# Patient Record
Sex: FEMALE | Race: WHITE | NOT HISPANIC OR LATINO | Employment: OTHER | URBAN - METROPOLITAN AREA
[De-identification: names, ages, dates, MRNs, and addresses within clinical notes are randomized per-mention and may not be internally consistent; named-entity substitution may affect disease eponyms.]

---

## 2020-03-06 ENCOUNTER — HOSPITAL ENCOUNTER (OUTPATIENT)
Dept: OTHER | Facility: HOSPITAL | Age: 65
Discharge: HOME | End: 2020-03-06
Attending: NURSE PRACTITIONER

## 2020-03-06 LAB
ANION GAP SERPL CALC-SCNC: 22 MMOL/L (ref 8–19)
BASOPHILS # BLD AUTO: 0.05 10*3/UL (ref 0–0.2)
BASOPHILS NFR BLD AUTO: 0.8 % (ref 0–3)
BUN SERPL-MCNC: 12 MG/DL (ref 5–25)
BUN/CREAT SERPL: 16 {RATIO} (ref 6–20)
CALCIUM SERPL-MCNC: 8.8 MG/DL (ref 8.7–10.4)
CHLORIDE SERPL-SCNC: 101 MMOL/L (ref 99–111)
CHOLEST SERPL-MCNC: 220 MG/DL (ref 107–200)
CHOLEST/HDLC SERPL: 4 {RATIO} (ref 3–6)
CONV ABS IMM GRAN: 0.03 10*3/UL (ref 0–0.2)
CONV CO2: 21 MMOL/L (ref 22–32)
CONV IMMATURE GRAN: 0.5 % (ref 0–1.8)
CREAT UR-MCNC: 0.76 MG/DL (ref 0.5–0.9)
DEPRECATED RDW RBC AUTO: 47.3 FL (ref 36.4–46.3)
EOSINOPHIL # BLD AUTO: 0.16 10*3/UL (ref 0–0.7)
EOSINOPHIL # BLD AUTO: 2.6 % (ref 0–7)
ERYTHROCYTE [DISTWIDTH] IN BLOOD BY AUTOMATED COUNT: 13.2 % (ref 11.7–14.4)
FOLATE SERPL-MCNC: 12.4 NG/ML (ref 4.8–20)
GFR SERPLBLD BASED ON 1.73 SQ M-ARVRAT: >60 ML/MIN/{1.73_M2}
GLUCOSE SERPL-MCNC: 108 MG/DL (ref 65–99)
HCT VFR BLD AUTO: 42.6 % (ref 37–47)
HDLC SERPL-MCNC: 55 MG/DL (ref 40–60)
HGB BLD-MCNC: 13.6 G/DL (ref 12–16)
LDLC SERPL CALC-MCNC: 124 MG/DL (ref 70–100)
LYMPHOCYTES # BLD AUTO: 2.13 10*3/UL (ref 1–5)
LYMPHOCYTES NFR BLD AUTO: 34.5 % (ref 20–45)
MCH RBC QN AUTO: 30.9 PG (ref 27–31)
MCHC RBC AUTO-ENTMCNC: 31.9 G/DL (ref 33–37)
MCV RBC AUTO: 96.8 FL (ref 81–99)
MONOCYTES # BLD AUTO: 0.47 10*3/UL (ref 0.2–1.2)
MONOCYTES NFR BLD AUTO: 7.6 % (ref 3–10)
NEUTROPHILS # BLD AUTO: 3.34 10*3/UL (ref 2–8)
NEUTROPHILS NFR BLD AUTO: 54 % (ref 30–85)
NRBC CBCN: 0 % (ref 0–0.7)
OSMOLALITY SERPL CALC.SUM OF ELEC: 290 MOSM/KG (ref 273–304)
PLATELET # BLD AUTO: 214 10*3/UL (ref 130–400)
PMV BLD AUTO: 10.5 FL (ref 9.4–12.3)
POTASSIUM SERPL-SCNC: 3.9 MMOL/L (ref 3.5–5.3)
RBC # BLD AUTO: 4.4 10*6/UL (ref 4.2–5.4)
SODIUM SERPL-SCNC: 140 MMOL/L (ref 135–147)
TRIGL SERPL-MCNC: 207 MG/DL (ref 40–150)
VIT B12 SERPL-MCNC: 255 PG/ML (ref 211–911)
VLDLC SERPL-MCNC: 41 MG/DL (ref 5–37)
WBC # BLD AUTO: 6.18 10*3/UL (ref 4.8–10.8)

## 2020-03-30 ENCOUNTER — HOSPITAL ENCOUNTER (OUTPATIENT)
Dept: OTHER | Facility: HOSPITAL | Age: 65
Discharge: HOME | End: 2020-03-30
Attending: PHYSICAL MEDICINE & REHABILITATION

## 2020-03-30 LAB
ALBUMIN SERPL-MCNC: 4.1 G/DL (ref 3.5–5)
ALBUMIN/GLOB SERPL: 1.7 {RATIO} (ref 1.4–2.6)
ALP SERPL-CCNC: 65 U/L (ref 43–160)
ALT SERPL-CCNC: 8 U/L (ref 10–40)
ANION GAP SERPL CALC-SCNC: 19 MMOL/L (ref 8–19)
AST SERPL-CCNC: 11 U/L (ref 15–50)
BASOPHILS # BLD AUTO: 0.08 10*3/UL (ref 0–0.2)
BASOPHILS NFR BLD AUTO: 0.9 % (ref 0–3)
BILIRUB SERPL-MCNC: <0.15 MG/DL (ref 0.2–1.3)
BUN SERPL-MCNC: 13 MG/DL (ref 5–25)
BUN/CREAT SERPL: 15 {RATIO} (ref 6–20)
CALCIUM SERPL-MCNC: 9 MG/DL (ref 8.7–10.4)
CHLORIDE SERPL-SCNC: 106 MMOL/L (ref 99–111)
CHOLEST SERPL-MCNC: 236 MG/DL (ref 107–200)
CHOLEST/HDLC SERPL: 5.1 {RATIO} (ref 3–6)
CONV ABS IMM GRAN: 0.02 10*3/UL (ref 0–0.2)
CONV CO2: 21 MMOL/L (ref 22–32)
CONV IMMATURE GRAN: 0.2 % (ref 0–1.8)
CONV TOTAL PROTEIN: 6.5 G/DL (ref 6.3–8.2)
CREAT UR-MCNC: 0.89 MG/DL (ref 0.5–0.9)
DEPRECATED RDW RBC AUTO: 45.2 FL (ref 36.4–46.3)
EOSINOPHIL # BLD AUTO: 0.26 10*3/UL (ref 0–0.7)
EOSINOPHIL # BLD AUTO: 3 % (ref 0–7)
ERYTHROCYTE [DISTWIDTH] IN BLOOD BY AUTOMATED COUNT: 12.7 % (ref 11.7–14.4)
GFR SERPLBLD BASED ON 1.73 SQ M-ARVRAT: >60 ML/MIN/{1.73_M2}
GLOBULIN UR ELPH-MCNC: 2.4 G/DL (ref 2–3.5)
GLUCOSE SERPL-MCNC: 117 MG/DL (ref 65–99)
HCT VFR BLD AUTO: 43.3 % (ref 37–47)
HDLC SERPL-MCNC: 46 MG/DL (ref 40–60)
HGB BLD-MCNC: 14.3 G/DL (ref 12–16)
LDLC SERPL CALC-MCNC: 141 MG/DL (ref 70–100)
LYMPHOCYTES # BLD AUTO: 3.96 10*3/UL (ref 1–5)
LYMPHOCYTES NFR BLD AUTO: 46 % (ref 20–45)
MCH RBC QN AUTO: 31.6 PG (ref 27–31)
MCHC RBC AUTO-ENTMCNC: 33 G/DL (ref 33–37)
MCV RBC AUTO: 95.8 FL (ref 81–99)
MONOCYTES # BLD AUTO: 0.64 10*3/UL (ref 0.2–1.2)
MONOCYTES NFR BLD AUTO: 7.4 % (ref 3–10)
NEUTROPHILS # BLD AUTO: 3.64 10*3/UL (ref 2–8)
NEUTROPHILS NFR BLD AUTO: 42.5 % (ref 30–85)
NRBC CBCN: 0 % (ref 0–0.7)
OSMOLALITY SERPL CALC.SUM OF ELEC: 295 MOSM/KG (ref 273–304)
PLATELET # BLD AUTO: 222 10*3/UL (ref 130–400)
PMV BLD AUTO: 10.4 FL (ref 9.4–12.3)
POTASSIUM SERPL-SCNC: 4.1 MMOL/L (ref 3.5–5.3)
RBC # BLD AUTO: 4.52 10*6/UL (ref 4.2–5.4)
SODIUM SERPL-SCNC: 142 MMOL/L (ref 135–147)
T4 FREE SERPL-MCNC: 0.9 NG/DL (ref 0.9–1.8)
TRIGL SERPL-MCNC: 246 MG/DL (ref 40–150)
TSH SERPL-ACNC: 3.78 M[IU]/L (ref 0.27–4.2)
VALPROATE SERPL-MCNC: 15 UG/ML (ref 50–100)
VLDLC SERPL-MCNC: 49 MG/DL (ref 5–37)
WBC # BLD AUTO: 8.6 10*3/UL (ref 4.8–10.8)

## 2020-10-20 ENCOUNTER — HOSPITAL ENCOUNTER (OUTPATIENT)
Dept: OTHER | Facility: HOSPITAL | Age: 65
Discharge: HOME | End: 2020-10-20
Attending: PHYSICAL MEDICINE & REHABILITATION

## 2020-10-20 LAB
APPEARANCE UR: CLEAR
BILIRUB UR QL: NEGATIVE
COLOR UR: YELLOW
CONV COLLECTION SOURCE (UA): NORMAL
CONV UROBILINOGEN IN URINE BY AUTOMATED TEST STRIP: 1 {EHRLICHU}/DL (ref 0.1–1)
GLUCOSE UR QL: NEGATIVE MG/DL
HGB UR QL STRIP: NEGATIVE
KETONES UR QL STRIP: NEGATIVE MG/DL
LEUKOCYTE ESTERASE UR QL STRIP: NEGATIVE
NITRITE UR QL STRIP: NEGATIVE
PH UR STRIP.AUTO: 7.5 [PH] (ref 5–8)
PROT UR QL: NEGATIVE MG/DL
SP GR UR: 1.02 (ref 1–1.03)

## 2020-10-22 LAB — BACTERIA UR CULT: NORMAL

## 2020-10-30 ENCOUNTER — HOSPITAL ENCOUNTER (OUTPATIENT)
Dept: OTHER | Facility: HOSPITAL | Age: 65
Discharge: HOME | End: 2020-10-30
Attending: NURSE PRACTITIONER

## 2020-11-01 LAB
AMPICILLIN SUSC ISLT: <=2
AMPICILLIN+SULBAC SUSC ISLT: <=2
BACTERIA UR CULT: ABNORMAL
CEFAZOLIN SUSC ISLT: <=4
CEFEPIME SUSC ISLT: <=0.12
CEFTAZIDIME SUSC ISLT: <=1
CEFTRIAXONE SUSC ISLT: <=0.25
CIPROFLOXACIN SUSC ISLT: <=0.25
ERTAPENEM SUSC ISLT: <=0.12
GENTAMICIN SUSC ISLT: <=1
LEVOFLOXACIN SUSC ISLT: <=0.12
NITROFURANTOIN SUSC ISLT: 128
PIP+TAZO SUSC ISLT: <=4
TMP SMX SUSC ISLT: <=20
TOBRAMYCIN SUSC ISLT: <=1

## 2020-11-23 ENCOUNTER — HOSPITAL ENCOUNTER (OUTPATIENT)
Dept: OTHER | Facility: HOSPITAL | Age: 65
Discharge: HOME | End: 2020-11-23
Attending: PHYSICAL MEDICINE & REHABILITATION

## 2020-11-25 LAB — BACTERIA UR CULT: NORMAL

## 2021-05-23 ENCOUNTER — TRANSCRIBE ORDERS (OUTPATIENT)
Dept: ADMINISTRATIVE | Facility: HOSPITAL | Age: 66
End: 2021-05-23

## 2021-05-23 DIAGNOSIS — Z12.31 OTHER SCREENING MAMMOGRAM: Primary | ICD-10-CM

## 2021-09-17 ENCOUNTER — APPOINTMENT (OUTPATIENT)
Dept: MAMMOGRAPHY | Facility: HOSPITAL | Age: 66
End: 2021-09-17

## 2021-10-07 ENCOUNTER — APPOINTMENT (OUTPATIENT)
Dept: GENERAL RADIOLOGY | Facility: HOSPITAL | Age: 66
End: 2021-10-07

## 2021-10-07 ENCOUNTER — HOSPITAL ENCOUNTER (INPATIENT)
Facility: HOSPITAL | Age: 66
LOS: 3 days | Discharge: HOME | End: 2021-10-10
Attending: EMERGENCY MEDICINE | Admitting: INTERNAL MEDICINE

## 2021-10-07 ENCOUNTER — APPOINTMENT (OUTPATIENT)
Dept: CT IMAGING | Facility: HOSPITAL | Age: 66
End: 2021-10-07

## 2021-10-07 DIAGNOSIS — R41.82 ALTERED MENTAL STATUS, UNSPECIFIED ALTERED MENTAL STATUS TYPE: ICD-10-CM

## 2021-10-07 DIAGNOSIS — R27.0 ATAXIA: ICD-10-CM
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
## 2021-10-07 DIAGNOSIS — S09.90XA MINOR HEAD INJURY, INITIAL ENCOUNTER: ICD-10-CM

## 2021-10-07 DIAGNOSIS — E87.1 HYPONATREMIA: ICD-10-CM

## 2021-10-07 DIAGNOSIS — T50.905A MEDICATION REACTION, INITIAL ENCOUNTER: Primary | ICD-10-CM

## 2021-10-07 DIAGNOSIS — E22.2 SIADH (SYNDROME OF INAPPROPRIATE ADH PRODUCTION) (HCC): ICD-10-CM

## 2021-10-07 LAB
ALBUMIN SERPL-MCNC: 3.7 G/DL (ref 3.5–5.2)
ALBUMIN/GLOB SERPL: 1.9 G/DL
ALP SERPL-CCNC: 38 U/L (ref 39–117)
ALT SERPL W P-5'-P-CCNC: 8 U/L (ref 1–33)
AMMONIA BLD-SCNC: 24 UMOL/L (ref 11–51)
AMPHET+METHAMPHET UR QL: NEGATIVE
ANION GAP SERPL CALCULATED.3IONS-SCNC: 13.6 MMOL/L (ref 5–15)
AST SERPL-CCNC: 11 U/L (ref 1–32)
BARBITURATES UR QL SCN: NEGATIVE
BASOPHILS # BLD AUTO: 0.06 10*3/MM3 (ref 0–0.2)
BASOPHILS NFR BLD AUTO: 1.2 % (ref 0–1.5)
BENZODIAZ UR QL SCN: NEGATIVE
BILIRUB SERPL-MCNC: 0.3 MG/DL (ref 0–1.2)
BUN SERPL-MCNC: 9 MG/DL (ref 8–23)
BUN/CREAT SERPL: 11.8 (ref 7–25)
CALCIUM SPEC-SCNC: 8.9 MG/DL (ref 8.6–10.5)
CANNABINOIDS SERPL QL: NEGATIVE
CHLORIDE SERPL-SCNC: 86 MMOL/L (ref 98–107)
CO2 SERPL-SCNC: 21.4 MMOL/L (ref 22–29)
COCAINE UR QL: NEGATIVE
CREAT SERPL-MCNC: 0.76 MG/DL (ref 0.57–1)
DEPRECATED RDW RBC AUTO: 40.6 FL (ref 37–54)
EOSINOPHIL # BLD AUTO: 0.13 10*3/MM3 (ref 0–0.4)
EOSINOPHIL NFR BLD AUTO: 2.7 % (ref 0.3–6.2)
ERYTHROCYTE [DISTWIDTH] IN BLOOD BY AUTOMATED COUNT: 11.9 % (ref 12.3–15.4)
ETHANOL BLD-MCNC: <10 MG/DL (ref 0–10)
ETHANOL UR QL: <0.01 %
GFR SERPL CREATININE-BSD FRML MDRD: 76 ML/MIN/1.73
GLOBULIN UR ELPH-MCNC: 1.9 GM/DL
GLUCOSE BLDC GLUCOMTR-MCNC: 85 MG/DL (ref 70–99)
GLUCOSE SERPL-MCNC: 98 MG/DL (ref 65–99)
HCT VFR BLD AUTO: 42.6 % (ref 34–46.6)
HGB BLD-MCNC: 14.5 G/DL (ref 12–15.9)
HOLD SPECIMEN: NORMAL
HOLD SPECIMEN: NORMAL
IMM GRANULOCYTES # BLD AUTO: 0.07 10*3/MM3 (ref 0–0.05)
IMM GRANULOCYTES NFR BLD AUTO: 1.4 % (ref 0–0.5)
INR PPP: 1.2 (ref 2–3)
LYMPHOCYTES # BLD AUTO: 1.56 10*3/MM3 (ref 0.7–3.1)
LYMPHOCYTES NFR BLD AUTO: 32.1 % (ref 19.6–45.3)
MAGNESIUM SERPL-MCNC: 1.8 MG/DL (ref 1.6–2.4)
MCH RBC QN AUTO: 31.6 PG (ref 26.6–33)
MCHC RBC AUTO-ENTMCNC: 34 G/DL (ref 31.5–35.7)
MCV RBC AUTO: 92.8 FL (ref 79–97)
METHADONE UR QL SCN: NEGATIVE
MONOCYTES # BLD AUTO: 0.43 10*3/MM3 (ref 0.1–0.9)
MONOCYTES NFR BLD AUTO: 8.8 % (ref 5–12)
NEUTROPHILS NFR BLD AUTO: 2.61 10*3/MM3 (ref 1.7–7)
NEUTROPHILS NFR BLD AUTO: 53.8 % (ref 42.7–76)
NRBC BLD AUTO-RTO: 0 /100 WBC (ref 0–0.2)
OPIATES UR QL: NEGATIVE
OXYCODONE UR QL SCN: NEGATIVE
PLATELET # BLD AUTO: 147 10*3/MM3 (ref 140–450)
PMV BLD AUTO: 9.4 FL (ref 6–12)
POTASSIUM SERPL-SCNC: 3.6 MMOL/L (ref 3.5–5.2)
PROT SERPL-MCNC: 5.6 G/DL (ref 6–8.5)
PROTHROMBIN TIME: 12.8 SECONDS (ref 9.4–12)
RBC # BLD AUTO: 4.59 10*6/MM3 (ref 3.77–5.28)
SODIUM SERPL-SCNC: 121 MMOL/L (ref 136–145)
TROPONIN T SERPL-MCNC: <0.01 NG/ML (ref 0–0.03)
TROPONIN T SERPL-MCNC: <0.01 NG/ML (ref 0–0.03)
VALPROATE SERPL-MCNC: 147.4 MCG/ML (ref 50–125)
WBC # BLD AUTO: 4.86 10*3/MM3 (ref 3.4–10.8)
WHOLE BLOOD HOLD SPECIMEN: NORMAL
WHOLE BLOOD HOLD SPECIMEN: NORMAL

## 2021-10-07 PROCEDURE — 80307 DRUG TEST PRSMV CHEM ANLYZR: CPT | Performed by: EMERGENCY MEDICINE

## 2021-10-07 PROCEDURE — 85610 PROTHROMBIN TIME: CPT | Performed by: EMERGENCY MEDICINE

## 2021-10-07 PROCEDURE — 70450 CT HEAD/BRAIN W/O DYE: CPT

## 2021-10-07 PROCEDURE — 80053 COMPREHEN METABOLIC PANEL: CPT | Performed by: EMERGENCY MEDICINE

## 2021-10-07 PROCEDURE — 93005 ELECTROCARDIOGRAM TRACING: CPT | Performed by: EMERGENCY MEDICINE

## 2021-10-07 PROCEDURE — 83735 ASSAY OF MAGNESIUM: CPT | Performed by: EMERGENCY MEDICINE

## 2021-10-07 PROCEDURE — 71045 X-RAY EXAM CHEST 1 VIEW: CPT

## 2021-10-07 PROCEDURE — 84484 ASSAY OF TROPONIN QUANT: CPT | Performed by: EMERGENCY MEDICINE

## 2021-10-07 PROCEDURE — 80164 ASSAY DIPROPYLACETIC ACD TOT: CPT | Performed by: EMERGENCY MEDICINE

## 2021-10-07 PROCEDURE — 72125 CT NECK SPINE W/O DYE: CPT

## 2021-10-07 PROCEDURE — 82962 GLUCOSE BLOOD TEST: CPT

## 2021-10-07 PROCEDURE — 93010 ELECTROCARDIOGRAM REPORT: CPT | Performed by: INTERNAL MEDICINE

## 2021-10-07 PROCEDURE — 82077 ASSAY SPEC XCP UR&BREATH IA: CPT | Performed by: EMERGENCY MEDICINE

## 2021-10-07 PROCEDURE — 99285 EMERGENCY DEPT VISIT HI MDM: CPT

## 2021-10-07 PROCEDURE — 82140 ASSAY OF AMMONIA: CPT | Performed by: EMERGENCY MEDICINE

## 2021-10-07 PROCEDURE — 85025 COMPLETE CBC W/AUTO DIFF WBC: CPT | Performed by: EMERGENCY MEDICINE

## 2021-10-07 RX ORDER — TOPIRAMATE 100 MG/1
100 TABLET, FILM COATED ORAL 2 TIMES DAILY
COMMUNITY
End: 2021-10-10 | Stop reason: HOSPADM

## 2021-10-07 RX ORDER — SODIUM CHLORIDE 0.9 % (FLUSH) 0.9 %
10 SYRINGE (ML) INJECTION AS NEEDED
Status: DISCONTINUED | OUTPATIENT
Start: 2021-10-07 | End: 2021-10-10 | Stop reason: HOSPADM

## 2021-10-07 RX ORDER — PANTOPRAZOLE SODIUM 40 MG/1
40 TABLET, DELAYED RELEASE ORAL DAILY
COMMUNITY

## 2021-10-07 RX ORDER — CETIRIZINE HYDROCHLORIDE 10 MG/1
10 TABLET ORAL DAILY
COMMUNITY

## 2021-10-07 RX ORDER — BENZTROPINE MESYLATE 1 MG/1
1 TABLET ORAL 2 TIMES DAILY
COMMUNITY

## 2021-10-07 RX ORDER — DIVALPROEX SODIUM 500 MG/1
500 TABLET, DELAYED RELEASE ORAL EVERY MORNING
COMMUNITY

## 2021-10-07 RX ORDER — ESCITALOPRAM OXALATE 10 MG/1
10 TABLET ORAL DAILY
COMMUNITY

## 2021-10-07 RX ORDER — ASPIRIN 81 MG/1
81 TABLET, CHEWABLE ORAL DAILY
COMMUNITY

## 2021-10-07 RX ORDER — QUETIAPINE FUMARATE 300 MG/1
300 TABLET, FILM COATED ORAL EVERY MORNING
COMMUNITY
End: 2021-10-10 | Stop reason: HOSPADM

## 2021-10-07 RX ORDER — HYDROCHLOROTHIAZIDE 12.5 MG/1
12.5 CAPSULE, GELATIN COATED ORAL DAILY
Status: ON HOLD | COMMUNITY
End: 2021-10-08

## 2021-10-07 RX ADMIN — SODIUM CHLORIDE 1000 ML: 9 INJECTION, SOLUTION INTRAVENOUS at 21:04

## 2021-10-08 ENCOUNTER — APPOINTMENT (OUTPATIENT)
Dept: NEUROLOGY | Facility: HOSPITAL | Age: 66
End: 2021-10-08

## 2021-10-08 PROBLEM — E87.1 HYPONATREMIA: Status: ACTIVE | Noted: 2021-10-08

## 2021-10-08 PROBLEM — Z79.899 POLYPHARMACY: Chronic | Status: ACTIVE | Noted: 2021-10-08

## 2021-10-08 PROBLEM — R55 SYNCOPE: Status: ACTIVE | Noted: 2021-10-08

## 2021-10-08 LAB
ALBUMIN SERPL-MCNC: 4.1 G/DL (ref 3.5–5.2)
ALBUMIN/GLOB SERPL: 2.3 G/DL
ALP SERPL-CCNC: 48 U/L (ref 39–117)
ALT SERPL W P-5'-P-CCNC: 8 U/L (ref 1–33)
ANION GAP SERPL CALCULATED.3IONS-SCNC: 8.4 MMOL/L (ref 5–15)
AST SERPL-CCNC: 9 U/L (ref 1–32)
BILIRUB SERPL-MCNC: 0.2 MG/DL (ref 0–1.2)
BUN SERPL-MCNC: 9 MG/DL (ref 8–23)
BUN/CREAT SERPL: 11.1 (ref 7–25)
CALCIUM SPEC-SCNC: 9.1 MG/DL (ref 8.6–10.5)
CHLORIDE SERPL-SCNC: 91 MMOL/L (ref 98–107)
CO2 SERPL-SCNC: 29.6 MMOL/L (ref 22–29)
CREAT SERPL-MCNC: 0.81 MG/DL (ref 0.57–1)
GFR SERPL CREATININE-BSD FRML MDRD: 70 ML/MIN/1.73
GLOBULIN UR ELPH-MCNC: 1.8 GM/DL
GLUCOSE SERPL-MCNC: 96 MG/DL (ref 65–99)
OSMOLALITY UR: 294 MOSM/KG
POTASSIUM SERPL-SCNC: 3.5 MMOL/L (ref 3.5–5.2)
PROLACTIN SERPL-MCNC: 8.7 NG/ML (ref 4.79–23.3)
PROT SERPL-MCNC: 5.9 G/DL (ref 6–8.5)
SODIUM SERPL-SCNC: 129 MMOL/L (ref 136–145)
VALPROATE SERPL-MCNC: 117.2 MCG/ML (ref 50–125)

## 2021-10-08 PROCEDURE — 84146 ASSAY OF PROLACTIN: CPT | Performed by: INTERNAL MEDICINE

## 2021-10-08 PROCEDURE — 80164 ASSAY DIPROPYLACETIC ACD TOT: CPT | Performed by: INTERNAL MEDICINE

## 2021-10-08 PROCEDURE — 25010000002 ENOXAPARIN PER 10 MG: Performed by: INTERNAL MEDICINE

## 2021-10-08 PROCEDURE — 36415 COLL VENOUS BLD VENIPUNCTURE: CPT | Performed by: INTERNAL MEDICINE

## 2021-10-08 PROCEDURE — 25010000002 ONDANSETRON PER 1 MG: Performed by: INTERNAL MEDICINE

## 2021-10-08 PROCEDURE — 95816 EEG AWAKE AND DROWSY: CPT

## 2021-10-08 PROCEDURE — 83935 ASSAY OF URINE OSMOLALITY: CPT | Performed by: INTERNAL MEDICINE

## 2021-10-08 PROCEDURE — 80053 COMPREHEN METABOLIC PANEL: CPT | Performed by: INTERNAL MEDICINE

## 2021-10-08 RX ORDER — QUETIAPINE FUMARATE 200 MG/1
600 TABLET, FILM COATED ORAL NIGHTLY
Status: DISCONTINUED | OUTPATIENT
Start: 2021-10-08 | End: 2021-10-10 | Stop reason: HOSPADM

## 2021-10-08 RX ORDER — ONDANSETRON 2 MG/ML
4 INJECTION INTRAMUSCULAR; INTRAVENOUS EVERY 6 HOURS PRN
Status: DISCONTINUED | OUTPATIENT
Start: 2021-10-08 | End: 2021-10-10 | Stop reason: HOSPADM

## 2021-10-08 RX ORDER — QUETIAPINE FUMARATE 300 MG/1
600 TABLET, FILM COATED ORAL NIGHTLY
COMMUNITY

## 2021-10-08 RX ORDER — DIVALPROEX SODIUM 500 MG/1
1000 TABLET, DELAYED RELEASE ORAL
COMMUNITY

## 2021-10-08 RX ORDER — PANTOPRAZOLE SODIUM 40 MG/1
40 TABLET, DELAYED RELEASE ORAL
Status: DISCONTINUED | OUTPATIENT
Start: 2021-10-08 | End: 2021-10-10 | Stop reason: HOSPADM

## 2021-10-08 RX ORDER — ACETAMINOPHEN 325 MG/1
650 TABLET ORAL EVERY 6 HOURS PRN
Status: DISCONTINUED | OUTPATIENT
Start: 2021-10-08 | End: 2021-10-10 | Stop reason: HOSPADM

## 2021-10-08 RX ORDER — MIRTAZAPINE 45 MG/1
45 TABLET, ORALLY DISINTEGRATING ORAL NIGHTLY
COMMUNITY
End: 2021-10-10 | Stop reason: HOSPADM

## 2021-10-08 RX ORDER — BENZTROPINE MESYLATE 1 MG/1
1 TABLET ORAL EVERY 12 HOURS SCHEDULED
Status: DISCONTINUED | OUTPATIENT
Start: 2021-10-08 | End: 2021-10-10 | Stop reason: HOSPADM

## 2021-10-08 RX ORDER — HYDROCHLOROTHIAZIDE 12.5 MG/1
12.5 CAPSULE, GELATIN COATED ORAL DAILY
COMMUNITY
End: 2021-10-10 | Stop reason: HOSPADM

## 2021-10-08 RX ORDER — ALUMINA, MAGNESIA, AND SIMETHICONE 2400; 2400; 240 MG/30ML; MG/30ML; MG/30ML
15 SUSPENSION ORAL EVERY 4 HOURS PRN
Status: DISCONTINUED | OUTPATIENT
Start: 2021-10-08 | End: 2021-10-10 | Stop reason: HOSPADM

## 2021-10-08 RX ORDER — ERGOCALCIFEROL 1.25 MG/1
50000 CAPSULE ORAL 2 TIMES WEEKLY
COMMUNITY

## 2021-10-08 RX ORDER — LORAZEPAM 2 MG/ML
1 INJECTION INTRAMUSCULAR EVERY 4 HOURS PRN
Status: DISCONTINUED | OUTPATIENT
Start: 2021-10-08 | End: 2021-10-10 | Stop reason: HOSPADM

## 2021-10-08 RX ORDER — ESCITALOPRAM OXALATE 10 MG/1
10 TABLET ORAL DAILY
Status: DISCONTINUED | OUTPATIENT
Start: 2021-10-08 | End: 2021-10-10 | Stop reason: HOSPADM

## 2021-10-08 RX ADMIN — QUETIAPINE FUMARATE 600 MG: 200 TABLET ORAL at 20:03

## 2021-10-08 RX ADMIN — Medication 10 ML: at 09:41

## 2021-10-08 RX ADMIN — ESCITALOPRAM OXALATE 10 MG: 10 TABLET ORAL at 15:30

## 2021-10-08 RX ADMIN — ACETAMINOPHEN 650 MG: 325 TABLET ORAL at 09:39

## 2021-10-08 RX ADMIN — ONDANSETRON 4 MG: 2 INJECTION INTRAMUSCULAR; INTRAVENOUS at 04:58

## 2021-10-08 RX ADMIN — PANTOPRAZOLE SODIUM 40 MG: 40 TABLET, DELAYED RELEASE ORAL at 15:29

## 2021-10-08 RX ADMIN — BENZTROPINE MESYLATE 1 MG: 1 TABLET ORAL at 15:30

## 2021-10-08 RX ADMIN — ALUMINUM HYDROXIDE, MAGNESIUM HYDROXIDE, AND DIMETHICONE 15 ML: 400; 400; 40 SUSPENSION ORAL at 10:27

## 2021-10-08 RX ADMIN — ACETAMINOPHEN 650 MG: 325 TABLET ORAL at 15:35

## 2021-10-08 RX ADMIN — ALUMINUM HYDROXIDE, MAGNESIUM HYDROXIDE, AND DIMETHICONE 15 ML: 400; 400; 40 SUSPENSION ORAL at 15:31

## 2021-10-08 RX ADMIN — ACETAMINOPHEN 650 MG: 325 TABLET ORAL at 02:06

## 2021-10-08 RX ADMIN — ENOXAPARIN SODIUM 40 MG: 40 INJECTION SUBCUTANEOUS at 15:30

## 2021-10-08 RX ADMIN — BENZTROPINE MESYLATE 1 MG: 1 TABLET ORAL at 20:03

## 2021-10-08 NOTE — PLAN OF CARE
Goal Outcome Evaluation:  Plan of Care Reviewed With: patient   Vital signs are stable. Pt is awake and alert this shift. Had EEG completed per MD orders. Normal sinus rhythm on the cardiac monitor.

## 2021-10-08 NOTE — ED PROVIDER NOTES
Time: 8:08 PM EDT  Arrived by: ambulance  Chief Complaint: fall  History provided by: patient  History is limited by: N/A     History of Present Illness:  Patient is a 66 y.o. year old female that presents to the emergency department with fall. Pt states she got up from laying in bed and fell into the bathroom. Pt hit her head and had positive LOC.     Pt is not on blood thinners.       Fall  Mechanism of injury: fall    Injury location:  Head/neck  Head/neck injury location:  Head  Incident location:  Bathroom  Time since incident:  1 hour  Arrived directly from scene: yes    Fall:     Fall occurred:  Walking    Impact surface:  Hard floor    Entrapped after fall: no    Protective equipment: none    Suspicion of alcohol use: no    Suspicion of drug use: no    Prior to arrival data:     Loss of consciousness: yes    Associated symptoms: no back pain, no chest pain, no neck pain, no seizures and no vomiting        Similar Symptoms Previously: no  Recently seen: yes      Patient Care Team  Primary Care Provider: Provider, No Known    Past Medical History:     No Known Allergies  Past Medical History:   Diagnosis Date   • GERD (gastroesophageal reflux disease)    • Hypertension    • Seizures (HCC)      Past Surgical History:   Procedure Laterality Date   • BREAST SURGERY     • HYSTERECTOMY       History reviewed. No pertinent family history.    Home Medications:  Prior to Admission medications    Not on File        Social History:   Social History     Tobacco Use   • Smoking status: Current Every Day Smoker     Packs/day: 1.00   Substance Use Topics   • Alcohol use: Not Currently   • Drug use: Never         Review of Systems:  Review of Systems   Constitutional: Negative for chills and fever.   HENT: Negative for nosebleeds.    Eyes: Negative for redness.   Respiratory: Negative for cough and shortness of breath.    Cardiovascular: Negative for chest pain.   Gastrointestinal: Negative for diarrhea and vomiting.  "  Genitourinary: Negative for dysuria and frequency.   Musculoskeletal: Negative for back pain and neck pain.   Skin: Negative for rash.   Neurological: Negative for seizures and syncope.        Physical Exam:  /63 (BP Location: Left arm, Patient Position: Sitting)   Pulse 88   Temp 98.7 °F (37.1 °C) (Oral)   Resp 20   Ht 157.5 cm (62.01\")   Wt 88.2 kg (194 lb 7.1 oz)   SpO2 96%   BMI 35.56 kg/m²     Physical Exam  Vitals and nursing note reviewed.   Constitutional:       General: She is not in acute distress.     Appearance: Normal appearance. She is not toxic-appearing.   HENT:      Head: Normocephalic and atraumatic.      Comments: No external signs of trauma to head or back of head.      Nose: Nose normal.      Mouth/Throat:      Pharynx: Oropharynx is clear.   Eyes:      General: Lids are normal. No scleral icterus.     Extraocular Movements:      Right eye: No nystagmus.      Left eye: No nystagmus.      Conjunctiva/sclera: Conjunctivae normal.   Cardiovascular:      Rate and Rhythm: Normal rate and regular rhythm.      Pulses: Normal pulses.      Heart sounds: Normal heart sounds. No murmur heard.     Pulmonary:      Effort: Pulmonary effort is normal. No respiratory distress.      Breath sounds: Normal breath sounds. Decreased air movement (slightly diminished) present. No wheezing, rhonchi or rales.   Chest:      Chest wall: No tenderness.   Abdominal:      Palpations: Abdomen is soft.      Tenderness: There is no abdominal tenderness. There is no guarding or rebound.      Comments: No rigidity.   Musculoskeletal:         General: No tenderness. Normal range of motion.      Cervical back: Full passive range of motion without pain, normal range of motion and neck supple. No rigidity, tenderness or bony tenderness. Normal range of motion.      Thoracic back: No bony tenderness.      Right hip: No tenderness or bony tenderness.      Left hip: No tenderness or bony tenderness.      Right upper leg: " No bony tenderness.      Left upper leg: No bony tenderness.      Right knee: No bony tenderness.      Left knee: No bony tenderness.      Right lower leg: No bony tenderness. No edema.      Left lower leg: No bony tenderness. No edema.      Right ankle: No tenderness.      Left ankle: No tenderness.      Comments: No external signs of trauma. Pelvis is stable.    Skin:     General: Skin is warm and dry.      Findings: No rash.   Neurological:      Mental Status: She is alert. She is disoriented.      Cranial Nerves: Dysarthria present.      Sensory: Sensation is intact.      Motor: Motor function is intact. No pronator drift.      Comments: Disoriented to year.    Psychiatric:         Mood and Affect: Mood normal.         Behavior: Behavior normal.                Medications in the Emergency Department:  Medications   sodium chloride 0.9 % flush 10 mL (has no administration in time range)   sodium chloride 0.9 % flush 10 mL (has no administration in time range)   acetaminophen (TYLENOL) tablet 650 mg (650 mg Oral Given 10/8/21 0206)   ondansetron (ZOFRAN) injection 4 mg (has no administration in time range)   LORazepam (ATIVAN) injection 1 mg (has no administration in time range)   sodium chloride 0.9 % bolus 1,000 mL (0 mL Intravenous Stopped 10/8/21 0031)        Labs  Lab Results (last 24 hours)     Procedure Component Value Units Date/Time    Urine Drug Screen - Urine, Clean Catch [[id number]]  (Normal) Collected: 10/07/21 2008    Specimen: Urine, Clean Catch Updated: 10/07/21 2204     Amphet/Methamphet, Screen Negative     Barbiturates Screen, Urine Negative     Benzodiazepine Screen, Urine Negative     Cocaine Screen, Urine Negative     Opiate Screen Negative     THC, Screen, Urine Negative     Methadone Screen, Urine Negative     Oxycodone Screen, Urine Negative    Narrative:      Negative Thresholds Per Drugs Screened:    Amphetamines                 500 ng/ml  Barbiturates                 200  ng/ml  Benzodiazepines              100 ng/ml  Cocaine                      300 ng/ml  Methadone                    300 ng/ml  Opiates                      300 ng/ml  Oxycodone                    100 ng/ml  THC                           50 ng/ml    The Normal Value for all drugs tested is negative. This report includes final unconfirmed screening results to be used for medical treatment purposes only. Unconfirmed results must not be used for non-medical purposes such as employment or legal testing. Clinical consideration should be applied to any drug of abuse test, particularly when unconfirmed results are used.            CBC & Differential [[id number]]  (Abnormal) Collected: 10/07/21 2013    Specimen: Blood Updated: 10/07/21 2028    Narrative:      The following orders were created for panel order CBC & Differential.  Procedure                               Abnormality         Status                     ---------                               -----------         ------                     CBC Auto Differential[[id number]]        Abnormal            Final result                 Please view results for these tests on the individual orders.    Comprehensive Metabolic Panel [[id number]]  (Abnormal) Collected: 10/07/21 2013    Specimen: Blood Updated: 10/07/21 2112     Glucose 98 mg/dL      BUN 9 mg/dL      Creatinine 0.76 mg/dL      Sodium 121 mmol/L      Potassium 3.6 mmol/L      Comment: Slight hemolysis detected by analyzer. Results may be affected.        Chloride 86 mmol/L      CO2 21.4 mmol/L      Calcium 8.9 mg/dL      Total Protein 5.6 g/dL      Albumin 3.70 g/dL      ALT (SGPT) 8 U/L      AST (SGOT) 11 U/L      Comment: Slight hemolysis detected by analyzer. Results may be affected.        Alkaline Phosphatase 38 U/L      Total Bilirubin 0.3 mg/dL      eGFR Non African Amer 76 mL/min/1.73      Globulin 1.9 gm/dL      A/G Ratio 1.9 g/dL      BUN/Creatinine Ratio 11.8     Anion Gap 13.6 mmol/L     Narrative:       GFR Normal >60  Chronic Kidney Disease <60  Kidney Failure <15      Troponin [[id number]]  (Normal) Collected: 10/07/21 2013    Specimen: Blood Updated: 10/07/21 2101     Troponin T <0.010 ng/mL     Narrative:      Troponin T Reference Range:  <= 0.03 ng/mL-   Negative for AMI  >0.03 ng/mL-     Abnormal for myocardial necrosis.  Clinicians would have to utilize clinical acumen, EKG, Troponin and serial changes to determine if it is an Acute Myocardial Infarction or myocardial injury due to an underlying chronic condition.       Results may be falsely decreased if patient taking Biotin.      Magnesium [[id number]]  (Normal) Collected: 10/07/21 2013    Specimen: Blood Updated: 10/07/21 2101     Magnesium 1.8 mg/dL     CBC Auto Differential [[id number]]  (Abnormal) Collected: 10/07/21 2013    Specimen: Blood Updated: 10/07/21 2028     WBC 4.86 10*3/mm3      RBC 4.59 10*6/mm3      Hemoglobin 14.5 g/dL      Hematocrit 42.6 %      MCV 92.8 fL      MCH 31.6 pg      MCHC 34.0 g/dL      RDW 11.9 %      RDW-SD 40.6 fl      MPV 9.4 fL      Platelets 147 10*3/mm3      Neutrophil % 53.8 %      Lymphocyte % 32.1 %      Monocyte % 8.8 %      Eosinophil % 2.7 %      Basophil % 1.2 %      Immature Grans % 1.4 %      Neutrophils, Absolute 2.61 10*3/mm3      Lymphocytes, Absolute 1.56 10*3/mm3      Monocytes, Absolute 0.43 10*3/mm3      Eosinophils, Absolute 0.13 10*3/mm3      Basophils, Absolute 0.06 10*3/mm3      Immature Grans, Absolute 0.07 10*3/mm3      nRBC 0.0 /100 WBC     Valproic Acid Level, Total [[id number]]  (Abnormal) Collected: 10/07/21 2013    Specimen: Blood Updated: 10/07/21 2129     Valproic Acid 147.4 mcg/mL     Narrative:      Therapeutic Ranges for Valproic Acid    Epilepsy:       mcg/ml  Bipolar/Nadiya  up to 125 mcg/ml      Ethanol [[id number]] Collected: 10/07/21 2013    Specimen: Blood Updated: 10/07/21 2129     Ethanol <10 mg/dL      Ethanol % <0.010 %     Narrative:      Ethanol (Plasma)  <10 Essentially  Negative    Toxic Concentrations           mg/dL    Flushing, slowing of reflexes    Impaired visual activity         Depression of CNS              >100  Possible Coma                  >300       POC Glucose Once [[id number]]  (Normal) Collected: 10/07/21 2018    Specimen: Blood Updated: 10/07/21 2022     Glucose 85 mg/dL      Comment: Serial Number: [id number]:  [id number]       Protime-INR [[id number]]  (Abnormal) Collected: 10/07/21 2134    Specimen: Blood Updated: 10/07/21 2157     Protime 12.8 Seconds      INR 1.20    Narrative:      Suggested Therapeutic Ranges For Oral Anticoagulant Therapy:  Level of Therapy                      INR Target Range  Standard Dose                            2.0-3.0  High Dose                                2.5-3.5  Patients not receiving anticoagulant  Therapy Normal Range                     0.6-1.2    Ammonia [[id number]]  (Normal) Collected: 10/07/21 2134    Specimen: Blood Updated: 10/07/21 2202     Ammonia 24 umol/L     Troponin [[id number]]  (Normal) Collected: 10/07/21 2134    Specimen: Blood Updated: 10/07/21 2203     Troponin T <0.010 ng/mL     Narrative:      Troponin T Reference Range:  <= 0.03 ng/mL-   Negative for AMI  >0.03 ng/mL-     Abnormal for myocardial necrosis.  Clinicians would have to utilize clinical acumen, EKG, Troponin and serial changes to determine if it is an Acute Myocardial Infarction or myocardial injury due to an underlying chronic condition.       Results may be falsely decreased if patient taking Biotin.             Imaging:  CT Head Without Contrast    Result Date: 10/7/2021  PROCEDURE: CT HEAD WO CONTRAST  COMPARISON: [facility], CT, CT CERVICAL SPINE WO CONTRAST, 10/07/2021, 22:10.  INDICATIONS: FALL; LOSS OF CONSCIOUSNESS; HEAD INJURY.  PROTOCOL:   Standard imaging protocol performed    RADIATION:   DLP: 1143mGy*cm   MA and/or KV was adjusted to minimize radiation dose.    TECHNIQUE: After obtaining the  patient's consent, 145 CT images were obtained without non-ionic intravenous contrast material.  DISCUSSION: A routine nonenhanced head CT was performed. No acute brain abnormality is identified. No acute intracranial hemorrhage. No acute infarction. No acute skull fracture. No midline shift or acute intracranial mass effect is seen.  Mild chronic small vessel ischemia/infarction is suspected. There are arterial calcifications. The extra-axial spaces and the ventricular system are mildly prominent.  The patient has undergone bilateral cataract extractions with intra-ocular lens implants.  There is mild chronic leftward nasal septal deviation.  Benign external auditory canal debris is suspected, especially on the left.  CONCLUSION: No acute brain abnormality is seen.     ESPERANZA GOODWIN JR, MD       Electronically Signed and Approved By: ESPERANZA GOODWIN JR, MD on 10/07/2021 at 22:29             CT Cervical Spine Without Contrast    Result Date: 10/7/2021  PROCEDURE: CT CERVICAL SPINE WO CONTRAST  COMPARISON: None.  INDICATIONS: FALL; LOSS OF CONSCIOUSNESS; HEAD INJURY. NECK PAIN.  PROTOCOL:   Standard imaging protocol performed    RADIATION:   DLP: 529.4mGy*cm   MA and/or KV was adjusted to minimize radiation dose.    TECHNIQUE: After obtaining the patient's consent, multi-planar CT images were created without contrast material.   EXAM FINDINGS: A routine nonenhanced cervical spine CT was performed. Sagittal and coronal two-dimensional reformations are provided for review. No acute cervical spine fracture or acute malalignment is identified. Small nonspecific bilateral cervical lymph nodes are seen.  Moderate to severe degenerative changes involve the cervical spine at multiple levels.  Extensive ossification of the anterior longitudinal ligament is seen and may represent diffuse idiopathic skeletal hyperostosis (DISH).  Arterial calcifications are seen.  Multiple-level neural foraminal narrowing is identified.  Mild  to moderate spinal stenosis is seen at several levels.  There are emphysematous changes of the lungs.  There is slight motion artifact on the study.  There may be mild pleural-parenchymal scarring in the partially imaged lung apices.  Degenerative changes involve the temporomandibular joints, probably greater on the left.  CONCLUSION:    No acute cervical spine fracture is seen.   ESPERANZA GOODWIN JR, MD       Electronically Signed and Approved By: ESPERANZA GOODWIN JR, MD on 10/07/2021 at 22:33             XR Chest 1 View    Result Date: 10/7/2021  PROCEDURE: XR CHEST 1 VW  COMPARISON: None.  INDICATIONS: WEAK/DIZZY/AMS TRIAGE PROTOCOL/ALTERED MENTAL STATUS.  FINDINGS: A single AP upright portable chest radiograph was performed.  There are slightly low lung volumes.  Atelectasis and/or infiltrate(s) is (are) seen in the left lung base.  Pneumonia cannot be excluded.  Probably no cardiac enlargement is seen.  The thoracic aorta is atherosclerotic.  External artifacts obscure detail.  No pneumothorax is seen.  There may be chronic calcified granulomatous disease of the chest.  Probably no acute infiltrate is seen on the right.  CONCLUSION: Atelectasis and/or infiltrate(s) is (are) suggested in the left lung base.  The findings may represent pneumonia, including aspiration pneumonia.     ESPERANZA GOODWIN JR, MD       Electronically Signed and Approved By: ESPERANZA GOODWIN JR, MD on 10/07/2021 at 20:56               Procedures:  Procedures    Progress  ED Course as of Oct 08 0248   Thu Oct 07, 2021   2342 EKG:    Rhythm: Sinus rhythm  Rate: 68  Intervals: Normal MI and QT interval  T-wave: Nonspecific diffuse T wave flattening, T wave inversion in V2, V3  ST Segment: No obvious pathological ST ovation or ST depression    EKG Comparison: Unavailable    Interpreted by me      [SD]      ED Course User Index  [SD] Zach Madera DO                            Medical Decision Making:  MDM  Number of Diagnoses or Management Options      Amount and/or Complexity of Data Reviewed  Clinical lab tests: reviewed  Tests in the radiology section of CPT®: reviewed  Tests in the medicine section of CPT®: reviewed  Decide to obtain previous medical records or to obtain history from someone other than the patient: yes  Discuss the patient with other providers: yes (I discussed case with Dr. Coreas.  We have discussed the patient's presenting symptoms, physical exam findings, laboratory evaluation as well as imaging.  He will be admitting the patient to the hospital)                 Final diagnoses:   Medication reaction, initial encounter   SIADH (syndrome of inappropriate ADH production) (HCC)   Hyponatremia   Ataxia   Altered mental status, unspecified altered mental status type   Fall, initial encounter   Minor head injury, initial encounter        Disposition:  ED Disposition     ED Disposition Condition Comment    Decision to Admit  Level of Care: Telemetry [5]   Diagnosis: Medication reaction, initial encounter [[id number]]   Admitting Physician: AISHA COREAS [[id number]]   Attending Physician: AISHA COREAS [[id number]]   Isolate for COVID?: No [0]   Certification: I Certify That Inpatient Hospital Services Are Medically Necessary For Greater Than 2 Midnights            This medical record created using voice recognition software and may contain unintended errors.    Documentation assistance provided by Aimee Wong acting as scribe for Zach Madera DO. Information recorded by the scribe was done at my direction and has been verified and validated by me.          Aimee Wong  10/07/21 2015       Aimee Wong  10/07/21 2018       Zach Madera DO  10/08/21 0248

## 2021-10-08 NOTE — H&P
[facility]   HISTORY AND PHYSICAL    Patient Name: Sasha Fuller  : 1955  MRN: [id number]  Primary Care Physician:  Provider, No Known  Date of admission: 10/7/2021    Subjective   Subjective     Chief Complaint:   Passing out spell    HPI:    Sasha Fuller is a 66 y.o. female admitted to hospital post syncope at home.  Patient reports she walked up to bathroom and fell.  When she woke up she crawled to the phone and called her daughter.  She was brought into ER work-up was negative for acute issues except for mildly elevated valproic acid as well as hyponatremia.  Patient has known history of seizure disorder.  Patient denies any incontinence issues.  She is awake alert and she reports acid reflux today.  She denies any chest pain or shortness of breath, denies any headache.      Review of Systems:    Personal History     Past Medical History:   Diagnosis Date   • GERD (gastroesophageal reflux disease)    • Hypertension    • Seizures (HCC)        Past Surgical History:   Procedure Laterality Date   • BREAST SURGERY     • HYSTERECTOMY         Family History: family history is not on file. Otherwise pertinent FHx was reviewed and not pertinent to current issue.    Social History:  reports that she has been smoking. She has been smoking about 1.00 pack per day. She does not have any smokeless tobacco history on file. She reports previous alcohol use. She reports that she does not use drugs.    Home Medications:  QUEtiapine, aspirin, benztropine, cetirizine, divalproex, escitalopram, hydroCHLOROthiazide, mirtazapine, pantoprazole, topiramate, and vitamin D      Allergies:  No Known Allergies    Objective   Objective     Vitals:   Temp:  [97.6 °F (36.4 °C)-98.7 °F (37.1 °C)] 97.9 °F (36.6 °C)  Heart Rate:  [] 68  Resp:  [16-21] 18  BP: (117-156)/() 141/59    Physical Exam    Elderly female not in acute distress.  ENT with pupils reactive extraocular muscle intact.  Supple no bruit no  thyromegaly  Heart regular.  Lungs clear  Abdomen obese and soft nontender.  Extremities Free of edema cyanosis and clubbing.  Neuro awake alert and oriented    I have personally reviewed the results from the time of this admission to 10/8/2021 10:17 EDT and agree with these findings:  [x]  Laboratory  []  Microbiology  []  Radiology  []  EKG/Telemetry   []  Cardiology/Vascular   []  Pathology  []  Old records  []  Other:    CBC:    WBC   Date Value Ref Range Status   10/07/2021 4.86 3.40 - 10.80 10*3/mm3 Final     RBC   Date Value Ref Range Status   10/07/2021 4.59 3.77 - 5.28 10*6/mm3 Final     Hemoglobin   Date Value Ref Range Status   10/07/2021 14.5 12.0 - 15.9 g/dL Final     Hematocrit   Date Value Ref Range Status   10/07/2021 42.6 34.0 - 46.6 % Final     MCV   Date Value Ref Range Status   10/07/2021 92.8 79.0 - 97.0 fL Final     MCH   Date Value Ref Range Status   10/07/2021 31.6 26.6 - 33.0 pg Final     MCHC   Date Value Ref Range Status   10/07/2021 34.0 31.5 - 35.7 g/dL Final     RDW   Date Value Ref Range Status   10/07/2021 11.9 (L) 12.3 - 15.4 % Final     RDW-SD   Date Value Ref Range Status   10/07/2021 40.6 37.0 - 54.0 fl Final     MPV   Date Value Ref Range Status   10/07/2021 9.4 6.0 - 12.0 fL Final     Platelets   Date Value Ref Range Status   10/07/2021 147 140 - 450 10*3/mm3 Final     Neutrophil %   Date Value Ref Range Status   10/07/2021 53.8 42.7 - 76.0 % Final     Lymphocyte %   Date Value Ref Range Status   10/07/2021 32.1 19.6 - 45.3 % Final     Monocyte %   Date Value Ref Range Status   10/07/2021 8.8 5.0 - 12.0 % Final     Eosinophil %   Date Value Ref Range Status   10/07/2021 2.7 0.3 - 6.2 % Final     Basophil %   Date Value Ref Range Status   10/07/2021 1.2 0.0 - 1.5 % Final     Immature Grans %   Date Value Ref Range Status   10/07/2021 1.4 (H) 0.0 - 0.5 % Final     Neutrophils, Absolute   Date Value Ref Range Status   10/07/2021 2.61 1.70 - 7.00 10*3/mm3 Final     Lymphocytes,  Absolute   Date Value Ref Range Status   10/07/2021 1.56 0.70 - 3.10 10*3/mm3 Final     Monocytes, Absolute   Date Value Ref Range Status   10/07/2021 0.43 0.10 - 0.90 10*3/mm3 Final     Eosinophils, Absolute   Date Value Ref Range Status   10/07/2021 0.13 0.00 - 0.40 10*3/mm3 Final     Basophils, Absolute   Date Value Ref Range Status   10/07/2021 0.06 0.00 - 0.20 10*3/mm3 Final     Immature Grans, Absolute   Date Value Ref Range Status   10/07/2021 0.07 (H) 0.00 - 0.05 10*3/mm3 Final     nRBC   Date Value Ref Range Status   10/07/2021 0.0 0.0 - 0.2 /100 WBC Final        BMP:    Lab Results   Component Value Date    GLUCOSE 96 10/08/2021    BUN 9 10/08/2021    CREATININE 0.81 10/08/2021    EGFRIFNONA 70 10/08/2021    BCR 11.1 10/08/2021    K 3.5 10/08/2021    CO2 29.6 (H) 10/08/2021    CALCIUM 9.1 10/08/2021    ALBUMIN 4.10 10/08/2021    LABIL2 2.0 03/26/2021    AST 9 10/08/2021    ALT 8 10/08/2021        No radiology results for the last day           Assessment/Plan   Assessment / Plan       Current Diagnosis:  Active Hospital Problems    Diagnosis    • Syncope    • Hyponatremia    • Polypharmacy    • Medication reaction, initial encounter      Plan:   Admitted with syncope.  Patient on multiple medications.  Known history of seizure disorder.  Extremely high doses of Seroquel and Remeron with polypharmacy.  Will consult neurologist for opinion.  Hold most of home meds.  Symptomatic treatment for now.  Fluids.  Monitor labs.  Increase activity.  PT and OT consults.  Possible discharge home in 1 to 2 days      DVT prophylaxis:  Medical DVT prophylaxis orders are present.    GI Prophylaxis:    Pepcid and Maalox    CODE STATUS:    Level Of Support Discussed With: Patient  Code Status: CPR  Medical Interventions (Level of Support Prior to Arrest): Full    Admission Status:  I believe this patient meets inpatient status.             I have dictated this note utilizing Dragon Dictation.             Please note that  portions of this note were completed with a voice recognition program.             Part of this note may be an electronic transcription/translation of spoken language to printed text         using the Dragon Dictation System.       Electronically signed by Torsten Morales MD, 10/08/21, 10:14 AM EDT.    Total time spent with in evaluation and management:

## 2021-10-08 NOTE — PLAN OF CARE
Goal Outcome Evaluation: Educated pt on the importance of calling when needing help. Pt agreed and demonstrated use of call light. Bed alarm on at this time.

## 2021-10-09 ENCOUNTER — APPOINTMENT (OUTPATIENT)
Dept: GENERAL RADIOLOGY | Facility: HOSPITAL | Age: 66
End: 2021-10-09

## 2021-10-09 PROCEDURE — 72220 X-RAY EXAM SACRUM TAILBONE: CPT

## 2021-10-09 PROCEDURE — 73562 X-RAY EXAM OF KNEE 3: CPT

## 2021-10-09 PROCEDURE — 25010000002 ENOXAPARIN PER 10 MG: Performed by: INTERNAL MEDICINE

## 2021-10-09 RX ORDER — SODIUM PHOSPHATE,MONO-DIBASIC 19G-7G/118
1 ENEMA (ML) RECTAL ONCE
Status: DISCONTINUED | OUTPATIENT
Start: 2021-10-09 | End: 2021-10-10 | Stop reason: HOSPADM

## 2021-10-09 RX ORDER — SODIUM PHOSPHATE, DIBASIC AND SODIUM PHOSPHATE, MONOBASIC 3.5; 9.5 G/66ML; G/66ML
1 ENEMA RECTAL ONCE
Status: DISCONTINUED | OUTPATIENT
Start: 2021-10-09 | End: 2021-10-09

## 2021-10-09 RX ORDER — POLYETHYLENE GLYCOL 3350 17 G/17G
17 POWDER, FOR SOLUTION ORAL DAILY
Status: DISCONTINUED | OUTPATIENT
Start: 2021-10-09 | End: 2021-10-10 | Stop reason: HOSPADM

## 2021-10-09 RX ADMIN — ALUMINUM HYDROXIDE, MAGNESIUM HYDROXIDE, AND DIMETHICONE 15 ML: 400; 400; 40 SUSPENSION ORAL at 11:40

## 2021-10-09 RX ADMIN — ESCITALOPRAM OXALATE 10 MG: 10 TABLET ORAL at 08:57

## 2021-10-09 RX ADMIN — QUETIAPINE FUMARATE 600 MG: 200 TABLET ORAL at 20:07

## 2021-10-09 RX ADMIN — ENOXAPARIN SODIUM 40 MG: 40 INJECTION SUBCUTANEOUS at 11:39

## 2021-10-09 RX ADMIN — BENZTROPINE MESYLATE 1 MG: 1 TABLET ORAL at 08:57

## 2021-10-09 RX ADMIN — ALUMINUM HYDROXIDE, MAGNESIUM HYDROXIDE, AND DIMETHICONE 15 ML: 400; 400; 40 SUSPENSION ORAL at 07:42

## 2021-10-09 RX ADMIN — BENZTROPINE MESYLATE 1 MG: 1 TABLET ORAL at 20:07

## 2021-10-09 NOTE — PLAN OF CARE
Goal Outcome Evaluation:  Plan of Care Reviewed With: patient        Progress: no change  Outcome Summary: VSS. No acute changes. Continue to monitor.  Zahraa Valles RN

## 2021-10-09 NOTE — PLAN OF CARE
Goal Outcome Evaluation: Pt educated on the importance of using call light before getting up, pt agreed and called out whenever needing help.

## 2021-10-09 NOTE — PROGRESS NOTES
[facility]     Progress Note    Patient Name: Sasha Fuller  : 1955  MRN: [id number]  Primary Care Physician:  Lei Coreas MD  Date of admission: 10/7/2021    Subjective   Subjective     Chief Complaint: Post syncope, hyponatremia, history of seizures in the past, postural hypotension, pain in knee and coccyx    HPI:  Patient Reports pain in the knee and coccyx post fall.  Also complains of constipation for more than a week    Review of Systems   Review of Systems   Constitutional: Activity change: in bed.   HENT: Negative.    Eyes: Negative.    Respiratory: Negative.    Cardiovascular: Negative.    Gastrointestinal: Positive for constipation.   Endocrine: Negative.    Genitourinary: Negative.    Musculoskeletal: Negative.    Skin: Negative.    Allergic/Immunologic: Negative.    Neurological: Positive for weakness.   Hematological: Negative.    Psychiatric/Behavioral: Negative.        Objective   Objective     Vitals:   Temp:  [97.7 °F (36.5 °C)-98.6 °F (37 °C)] 98.4 °F (36.9 °C)  Heart Rate:  [67-84] 83  Resp:  [18-20] 18  BP: (123-154)/(57-71) 153/67  Physical Exam    Constitutional: Awake, alert, oriented cooperative   Eyes: PERRLA, sclerae anicteric, no conjunctival injection   HENT: NCAT, mucous membranes moist   Neck: Supple, no thyromegaly, no lymphadenopathy, trachea midline   Respiratory: Clear to auscultation bilaterally, nonlabored respirations    Cardiovascular: RRR, no murmurs, rubs, or gallops, palpable pedal pulses bilaterally   Gastrointestinal: Positive bowel sounds, soft, nontender, nondistended   Musculoskeletal: No bilateral ankle edema, no clubbing or cyanosis to extremities   Psychiatric: Appropriate affect, cooperative   Neurologic: Oriented x 3, strength symmetric in all extremities, Cranial Nerves grossly intact to confrontation, speech clear   Skin: No rashes     Result Review    Result Review:  I have personally reviewed the results from the time of this admission to  10/9/2021 18:16 EDT and agree with these findings:  [x]  Laboratory  []  Microbiology  []  Radiology  []  EKG/Telemetry   []  Cardiology/Vascular   []  Pathology  []  Old records  []  Other:  Most notable findings include: Syncope, hyponatremia, postural hypotension    Assessment/Plan   Assessment / Plan   Syncope  Hyponatremia  Postural hypotension  Constipation  Pain in coccyx and knee after fall    Brief Patient Summary:  Sasha Fuller is a 66 y.o. female who fell at home and found to have hyponatremia    Active Hospital Problems:  Active Hospital Problems    Diagnosis    • Syncope    • Hyponatremia    • Polypharmacy    • Medication reaction, initial encounter      Plan:   X-rays of the coccyx and knee has been ordered by Dr. Remigio Shah for constipation  Fleet enema for constipation  Continue care  Medication adjusted by Dr. Morales     DVT prophylaxis:  Medical DVT prophylaxis orders are present.    CODE STATUS:   Level Of Support Discussed With: Patient  Code Status: CPR  Medical Interventions (Level of Support Prior to Arrest): Full      Electronically signed by Sergio Cristobal MD, 10/09/21, 6:16 PM EDT.

## 2021-10-09 NOTE — CONSULTS
[facility]   Consult Note      Patient Name: Sasha Fuller  : 1955  MRN: [id number]  Primary Care Physician:  Provider, No Known  Date of admission: 10/7/2021    Subjective   Subjective     66 years old woman was seen upon the request of Dr.Ayas Morales for evaluation.    Chief Complaint:   Syncope    HPI:  She dated the onset of her illness sometime during the night of 2021, when she got up from bed, after sitting on the edge of the bed for a while, she lost her balance and was able to lean herself onto the cabinet in front of her.  She she stayed up and motionless momentarily (she could not guesstimate how long she stood up) then she started walking to the restroom.  When she reached the restroom, she fell down.  There was no associated dizziness.  She claims she just fell down for no apparent reason.  Her buttocks landed on the floor first then the back of her head.  She did not lose consciousness.  She was awake all the time.  There were no shaking or jerking movements noted.    She mentioned that she has a seizure disorder since she was 9 years old, and the last time she had a seizure was in .  She is being maintained on unknown amount of Depakote and Topamax.  She could not remember the dosages.      Review of Systems  There is no difficulty seeing, speaking, swallowing and swallowing.  No difficulty in breathing.  There is no chest pains or abdominal pain is persistent incontinent of her urine.  She did not bite her lips or her tongue during and around the time she fell down on 10/7/2021.      Past Medical History:   Diagnosis Date   • GERD (gastroesophageal reflux disease)    • Hypertension    • Seizures (HCC)        Past Surgical History:   Procedure Laterality Date   • BREAST SURGERY     • HYSTERECTOMY         Family History: family history is not on file. Otherwise pertinent FHx was reviewed and not pertinent to current issue.    Social History:  reports that she has been  smoking. She has been smoking about 1.00 pack per day. She does not have any smokeless tobacco history on file. She reports previous alcohol use. She reports that she does not use drugs.    She started smoking when she was 4 or 5 years old.  She smoked a pack of cigarettes every day after this time.  She mentioned that her parents added vodka to her milk bottle when she was a baby to make her go to sleep.  She continues to drink alcoholic beverages until the time that she was 15 years old when she stopped drinking altogether.  She does not use street drugs.    Psychosocial History: Not known at this time.    Home Medications:  QUEtiapine, aspirin, benztropine, cetirizine, divalproex, escitalopram, hydroCHLOROthiazide, mirtazapine, pantoprazole, topiramate, and vitamin D      Allergies:  No Known Allergies    Vitals:   Temp:  [97.3 °F (36.3 °C)-98.7 °F (37.1 °C)] 98.6 °F (37 °C)  Heart Rate:  [] 72  Resp:  [16-21] 18  BP: (117-156)/() 144/58  Physical Exam   She was awake and alert was not informed distress.  She was para developed and fairly nourished.  The abdomen is flabby and protuberant.  She is overweight.    Her heart was regular.  Heart rate was 70/min.  There were no murmurs.  The lungs were clear.    The carotid pulses were 1+ and equal.  There were no bruit on either side.    Neurological Examination:  The responses were coherent and relevant.  She was aware of what was going on around her.  She has an insight to her condition.  She was able to understand and follow verbal command.    I did not look into the fundus on either side because of the COVID-19 pandemic social distancing.    The pupils were 3 to 4 mm there were round and equal reactive to light directly and consensually.  There were no extraocular muscle weakness.    No facial asymmetry.  No facial weakness.  Was able to hear normal conversational speech.    The strength of the sternomastoid and trapezius muscles were normal and  symmetrical.  The uvula and the tongue were in the midline.    The strength in both upper and lower extremities were normal and equal.    Felt pinprick equal in both sides of the forehead, face, lower jaw, both upper and lower extremities, and both sides of his trunk.    The deep tendon reflexes were absent on both sides.    Did finger-to-nose test fairly well equal on both sides.      Result Review    Result Review:  I have personally reviewed the results from the time of this admission to 10/8/2021 21:21 EDT and agree with these findings:  [x]  Laboratory  []  Microbiology  [x]  Radiology  []  EKG/Telemetry   []  Cardiology/Vascular   []  Pathology  []  Old records  []  Other:  Most notable findings include:   I reviewed the computer images of the CAT scan of her brain was done October 7, 2021.  Study showed no acute changes.  There is moderate cortical atrophy and ventricular dilatation.    We have the report of the EEG that was done on 10/8/2021.  The study was essentially normal.  There were no epileptiform discharges noted.      Impression:    Postural Hypotension  Generalized Seizure Disorder, controlled, none since 2003  Pain, coccyx.  Pain, right knee etiology undetermined.        Plan:   We will do well to table test which can be done on an outpatient basis if she were to go home.    Meanwhile, continue with the present amount of Depakote and Topamax that she has been taking.    We will see what the x-ray of the sacrum and coccyx and the right knee.    Thank you very much for some see this patient.  I will see the patient as needed.                    Electronically signed by Patrick Flood Jr., MD, 10/08/21, 9:21 PM EDT.

## 2021-10-10 VITALS
TEMPERATURE: 97.5 F | DIASTOLIC BLOOD PRESSURE: 51 MMHG | HEIGHT: 62 IN | RESPIRATION RATE: 20 BRPM | OXYGEN SATURATION: 97 % | HEART RATE: 83 BPM | WEIGHT: 194.45 LBS | SYSTOLIC BLOOD PRESSURE: 125 MMHG | BODY MASS INDEX: 35.78 KG/M2

## 2021-10-10 PROBLEM — R27.0 ATAXIA: Status: RESOLVED | Noted: 2021-10-10 | Resolved: 2021-10-10
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
## 2021-10-10 PROBLEM — E22.2 SIADH (SYNDROME OF INAPPROPRIATE ADH PRODUCTION): Status: ACTIVE | Noted: 2021-10-10

## 2021-10-10 PROBLEM — R27.0 ATAXIA: Status: ACTIVE | Noted: 2021-10-10

## 2021-10-10 PROBLEM — R41.82 ALTERED MENTAL STATUS: Status: RESOLVED | Noted: 2021-10-10 | Resolved: 2021-10-10

## 2021-10-10 PROBLEM — R41.82 ALTERED MENTAL STATUS: Status: ACTIVE | Noted: 2021-10-10

## 2021-10-10 PROBLEM — R55 SYNCOPE: Status: RESOLVED | Noted: 2021-10-08 | Resolved: 2021-10-10

## 2021-10-10 PROBLEM — T50.905A MEDICATION REACTION, INITIAL ENCOUNTER: Status: RESOLVED | Noted: 2021-10-07 | Resolved: 2021-10-10

## 2021-10-10 PROBLEM — S09.90XA MINOR HEAD INJURY: Status: ACTIVE | Noted: 2021-10-10
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
## 2021-10-10 PROBLEM — Z79.899 POLYPHARMACY: Chronic | Status: RESOLVED | Noted: 2021-10-08 | Resolved: 2021-10-10

## 2021-10-10 PROBLEM — S09.90XA MINOR HEAD INJURY: Status: RESOLVED | Noted: 2021-10-10 | Resolved: 2021-10-10

## 2021-10-10 PROCEDURE — 94799 UNLISTED PULMONARY SVC/PX: CPT

## 2021-10-10 PROCEDURE — 94760 N-INVAS EAR/PLS OXIMETRY 1: CPT

## 2021-10-10 PROCEDURE — 25010000002 ENOXAPARIN PER 10 MG: Performed by: INTERNAL MEDICINE

## 2021-10-10 RX ORDER — ALUMINA, MAGNESIA, AND SIMETHICONE 2400; 2400; 240 MG/30ML; MG/30ML; MG/30ML
15 SUSPENSION ORAL EVERY 6 HOURS PRN
Qty: 300 ML | Refills: 0 | Status: SHIPPED | OUTPATIENT
Start: 2021-10-10 | End: 2021-11-09

## 2021-10-10 RX ORDER — POLYETHYLENE GLYCOL 3350 17 G/17G
17 POWDER, FOR SOLUTION ORAL DAILY
Qty: 30 PACKET | Refills: 0 | Status: SHIPPED | OUTPATIENT
Start: 2021-10-11 | End: 2021-11-10

## 2021-10-10 RX ADMIN — ACETAMINOPHEN 650 MG: 325 TABLET ORAL at 03:34

## 2021-10-10 RX ADMIN — PANTOPRAZOLE SODIUM 40 MG: 40 TABLET, DELAYED RELEASE ORAL at 05:20

## 2021-10-10 RX ADMIN — BENZTROPINE MESYLATE 1 MG: 1 TABLET ORAL at 10:03

## 2021-10-10 RX ADMIN — POLYETHYLENE GLYCOL 3350 17 G: 17 POWDER, FOR SOLUTION ORAL at 10:04

## 2021-10-10 RX ADMIN — ENOXAPARIN SODIUM 40 MG: 40 INJECTION SUBCUTANEOUS at 10:04

## 2021-10-10 RX ADMIN — ALUMINUM HYDROXIDE, MAGNESIUM HYDROXIDE, AND DIMETHICONE 15 ML: 400; 400; 40 SUSPENSION ORAL at 05:20

## 2021-10-10 RX ADMIN — ESCITALOPRAM OXALATE 10 MG: 10 TABLET ORAL at 10:04

## 2021-10-10 NOTE — DISCHARGE SUMMARY
[facility]         DISCHARGE SUMMARY    Patient Name: Sasha Fuller  : 1955  MRN: [id number]    Date of Admission: 10/7/2021  Date of Discharge: 10/9/2021  Primary Care Physician: Lei Coreas MD    Consults     Date and Time Order Name Status Description    10/8/2021 10:20 AM Inpatient Neurology Consult General      10/7/2021 11:33 PM General MD Inpatient Consult Completed           Presenting Problem:   SIADH (syndrome of inappropriate ADH production) (HCC) [E22.2]  Ataxia [R27.0]  Hyponatremia [E87.1]  Medication reaction, initial encounter [T50.905A]  Minor head injury, initial encounter [S09.90XA]  Fall, initial encounter [W19.XXXA]  Altered mental status, unspecified altered mental status type [R41.82]    Active and Resolved Hospital Problems:  Active Hospital Problems    Diagnosis POA   • Hyponatremia [E87.1] Yes     Priority: Medium   • SIADH (syndrome of inappropriate ADH production) ([facility]) [E22.2] Yes      Resolved Hospital Problems    Diagnosis POA   • Syncope [R55] Yes     Priority: Medium   • Polypharmacy [Z79.899] Not Applicable     Priority: Medium   • Medication reaction, initial encounter [T50.905A] Yes     Priority: Medium   • Ataxia [R27.0] Yes   • Minor head injury [S09.90XA] Unknown   • Fall [W19.XXXA] Yes   • Altered mental status [R41.82] Yes         Hospital Course     Hospital Course:  Sasha Fuller is a 66 y.o. female who was admitted with syncope at home. On evaluation the ER was found to have hyponatremia. She was on multiple medications including Seroquel and Remeron. Neurologist consulted, Remigio. She gave a history of seizure disorder on Depakote and Topamax. They recommended to continue that. X ray sacrum and coccyx was done. X-ray of the knee showed degenerative changes no fracture. The coccyx was angulated not sure if it was an anatomic variant or fracture. She was anxious to go home and was discharged on 10/10/2021        DISCHARGE Follow Up  Recommendations for labs and diagnostics: Follow-up with PMD in 1 week      Day of Discharge     Vital Signs:  Temp:  [97.5 °F (36.4 °C)-98.4 °F (36.9 °C)] 97.5 °F (36.4 °C)  Heart Rate:  [71-92] 83  Resp:  [18-20] 20  BP: (119-153)/(49-77) 125/51  Physical Exam:  Constitutional: Awake, alert   Eyes: PERRLA, sclerae anicteric, no conjunctival injection   HENT: NCAT, mucous membranes moist   Neck: Supple, no thyromegaly, no lymphadenopathy, trachea midline   Respiratory: Clear to auscultation bilaterally, nonlabored respirations    Cardiovascular: RRR, no murmurs, rubs, or gallops, palpable pedal pulses bilaterally   Gastrointestinal: Positive bowel sounds, soft, nontender, nondistended   Musculoskeletal: No bilateral ankle edema, no clubbing or cyanosis to extremities   Psychiatric: Appropriate affect, cooperative   Neurologic: Oriented x 3, strength symmetric in all extremities, Cranial Nerves grossly intact to confrontation, speech clear   Skin: No rashes       Pertinent  and/or Most Recent Results     LAB RESULTS:      Lab 10/07/21  2134 10/07/21  2013   WBC  --  4.86   HEMOGLOBIN  --  14.5   HEMATOCRIT  --  42.6   PLATELETS  --  147   NEUTROS ABS  --  2.61   IMMATURE GRANS (ABS)  --  0.07*   LYMPHS ABS  --  1.56   MONOS ABS  --  0.43   EOS ABS  --  0.13   MCV  --  92.8   PROTIME 12.8*  --          Lab 10/08/21  0508 10/07/21  2013   SODIUM 129* 121*   POTASSIUM 3.5 3.6   CHLORIDE 91* 86*   CO2 29.6* 21.4*   ANION GAP 8.4 13.6   BUN 9 9   CREATININE 0.81 0.76   GLUCOSE 96 98   CALCIUM 9.1 8.9   MAGNESIUM  --  1.8         Lab 10/08/21  0508 10/07/21  2013   TOTAL PROTEIN 5.9* 5.6*   ALBUMIN 4.10 3.70   GLOBULIN 1.8 1.9   ALT (SGPT) 8 8   AST (SGOT) 9 11   BILIRUBIN 0.2 0.3   ALK PHOS 48 38*         Lab 10/07/21  2134 10/07/21  2013   TROPONIN T <0.010 <0.010   PROTIME 12.8*  --    INR 1.20*  --                  Brief Urine Lab Results  (Last result in the past 365 days)      Color   Clarity   Blood   Leuk Est    Nitrite   Protein   CREAT   Urine HCG        11/23/20 1230   CLEAR   NEGATIVE   NEGATIVE  Comment:  URINE MICROSCOPICS:    Only performed if any of the following are present:    Appearance is Sl Cloudy to Turbid; Protein is    30 to >/= 300 mg/dL; Occult Blood, Nitrite, or Leukocyte    Esterase is positive. Color is Red or Orange.     NEGATIVE                 Microbiology Results (last 10 days)     ** No results found for the last 240 hours. **                           Labs Pending at Discharge:  Pending Labs     Order Current Status    Chloride, Urine, Random - Urine, Clean Catch Collected (10/08/21 0959)    Sodium, Urine, Random - Urine, Clean Catch Collected (10/08/21 0959)    Urinalysis With Culture If Indicated - Collected (10/07/21 2009)            Discharge Details        Discharge Medications      New Medications      Instructions Start Date   aluminum-magnesium hydroxide-simethicone 400-400-40 MG/5ML suspension  Commonly known as: MAALOX MAX   15 mL, Oral, Every 6 Hours PRN      polyethylene glycol 17 g packet  Commonly known as: MIRALAX   17 g, Oral, Daily   Start Date: October 11, 2021        Changes to Medications      Instructions Start Date   QUEtiapine 300 MG tablet  Commonly known as: SEROquel  What changed: Another medication with the same name was removed. Continue taking this medication, and follow the directions you see here.   600 mg, Oral, Nightly         Continue These Medications      Instructions Start Date   aspirin 81 MG chewable tablet   81 mg, Oral, Daily      benztropine 1 MG tablet  Commonly known as: COGENTIN   1 mg, Oral, 2 Times Daily      cetirizine 10 MG tablet  Commonly known as: zyrTEC   10 mg, Oral, Daily      divalproex 500 MG DR tablet  Commonly known as: DEPAKOTE   500 mg, Oral, Every Morning      divalproex 500 MG DR tablet  Commonly known as: DEPAKOTE   1,000 mg, Oral, Every Night at Bedtime      escitalopram 10 MG tablet  Commonly known as: LEXAPRO   10 mg, Oral, Daily       pantoprazole 40 MG EC tablet  Commonly known as: PROTONIX   40 mg, Oral, Daily      vitamin D 1.25 MG (60309 UT) capsule capsule  Commonly known as: ERGOCALCIFEROL   50,000 Units, Oral, 2 Times Weekly         Stop These Medications    hydroCHLOROthiazide 12.5 MG capsule  Commonly known as: MICROZIDE     mirtazapine 45 MG disintegrating tablet  Commonly known as: REMERON SOL-TAB     topiramate 100 MG tablet  Commonly known as: TOPAMAX            No Known Allergies      Discharge Disposition:  Home or Self Care    Patient Condition on discharge: No further falls    Diet:  Hospital:  Diet Order   Procedures   • Diet Regular         Discharge Activity: As tolerated        CODE STATUS:  Code Status and Medical Interventions:   Ordered at: 10/08/21 0043     Level Of Support Discussed With:    Patient     Code Status:    CPR     Medical Interventions (Level of Support Prior to Arrest):    Full         No future appointments. Follow-up with PMD in 1 week          Electronically signed by Sergio Cristobal MD, 10/10/21, 11:45 AM EDT.

## 2021-10-10 NOTE — PLAN OF CARE
Goal Outcome Evaluation:  Plan of Care Reviewed With: patient        Progress: no change  Outcome Summary: Patient stable and ready for discharge.    Jodi Alvarado RN

## 2021-10-11 ENCOUNTER — READMISSION MANAGEMENT (OUTPATIENT)
Dept: CALL CENTER | Facility: HOSPITAL | Age: 66
End: 2021-10-11

## 2021-10-11 NOTE — OUTREACH NOTE
Prep Survey      Responses   [facility] patient discharged from? [facility]   Is LACE score < 7 ? Yes   Emergency Room discharge w/ pulse ox? No   Eligibility Not Eligible   What are the reasons patient is not eligible? Other   Does the patient have one of the following disease processes/diagnoses(primary or secondary)? Other   Prep survey completed? Yes          Randee Christine RN

## 2021-10-11 NOTE — PAYOR COMM NOTE
"Sasha Polo (66 y.o. Female)             Date of Birth Social Security Number Address Home Phone MRN    1955  [address] [place] [address] [phone] [id number]    [employer]rian Marital Status             [employer] Single       Admission Date Admission Type Admitting Provider Attending Provider Department, Room/Bed    10/7/21 Emergency Torsten Morales MD  [facility] CARE UNIT, 219/1    Discharge Date Discharge Disposition Discharge Destination          10/10/2021 Home or Self Care Home             Attending Provider: (none)   Allergies: No Known Allergies    Isolation: None   Infection: None   Code Status: Prior   Advance Care Planning Activity    Ht: 157.5 cm (62.01\")   Wt: 88.2 kg (194 lb 7.1 oz)    Admission Cmt: None   Principal Problem: None                Active Insurance as of 10/7/2021     Primary Coverage     Payor Plan Insurance Group Employer/Plan Group    [facility] WELL[employer] MEDICARE REPLACEMENT      Payor Plan Address Payor Plan Phone Number Payor Plan Fax Number Effective Dates    [address] [address] [phone]  10/7/2021 - None Entered    [facility] [id number]       Subscriber Name Subscriber Birth Date Member ID       Sasha Polo 1955 [id number]           Secondary Coverage     Payor Plan Insurance Group Employer/Plan Group    [facility] [employer]      Payor Plan Address Payor Plan Phone Number Payor Plan Fax Number Effective Dates    [address] [address] [phone]  10/7/2021 - None Entered    [facility] [id number]       Subscriber Name Subscriber Birth Date Member ID       SASHA POLO 1955 [id number]                 Emergency Contacts      (Rel.) Home Phone Work Phone Mobile Phone    TRIXIE GIBSON (Daughter) [phone] -- --          ++++RECONSIDERATION PLEASE++++++++    LOC:Acute Adult-Electrolyte or Mineral Imbalance by Kinsey Espino, RN         In Progress (Acute Met): Reviewed on " 10/8/2021 by Kinsey Espino RN       Created Using Review Status Review Entered   InterQual Connect™ In Primary 10/8/2021 09:49       Criteria Set Name - Subset   LOC:Acute Adult-Electrolyte or Mineral Imbalance      Criteria Review   REVIEW SUMMARY     InterQual® Review Status: In Primary  Condition Specific: Yes        REVIEW DETAILS     Product: LOC:Acute Adult  Subset: Electrolyte or Mineral Imbalance        (Symptom or finding within 24h)     (Excludes PO medications unless noted)         [X] Select Day, One:          [X] Episode Day 1, One:              [X] ACUTE, >= One:                  [X] Hyponatremia or SIADH, Both:                      [X] Finding, One:                          [X] Sodium 120-129 mEq/L(120-129 mmol/L) and symptomatic                      [X] Intervention, >= One:                          [X] Sodium chloride 0.9%(0.009)        Version: InterQual® 2021, Apr. 2021 Release  InterQual® criteria (IQ) is confidential and proprietary information and is being provided to you solely as it pertains to the information requested. IQ may contain advanced clinical knowledge which we recommend you discuss with your physician upon disclosure to you. Use permitted by and subject to license with [employer] and/or one of its subsidiaries. IQ reflects clinical interpretations and analyses and cannot alone either (a) resolve medical ambiguities of particular situations; or (b) provide the sole basis for definitive decisions. IQ is intended solely for use as screening guidelines with respect to medical appropriateness of healthcare services. All ultimate care decisions are strictly and solely the obligation and responsibility of your health care provider. © 2021 [employer] and/or one of its subsidiaries. All Rights Reserved. CPT® only © 4807-9542 American Medical Association. All Rights Reserved.            Discharge Summary      Sergio Cristobal MD at 10/10/21 1145                          [facility]         DISCHARGE SUMMARY    Patient Name: Sasha Fuller  : 1955  MRN: [id number]    Date of Admission: 10/7/2021  Date of Discharge: 10/9/2021  Primary Care Physician: Lei Coreas MD    Consults     Date and Time Order Name Status Description    10/8/2021 10:20 AM Inpatient Neurology Consult General      10/7/2021 11:33 PM General MD Inpatient Consult Completed           Presenting Problem:   SIADH (syndrome of inappropriate ADH production) (HCC) [E22.2]  Ataxia [R27.0]  Hyponatremia [E87.1]  Medication reaction, initial encounter [T50.905A]  Minor head injury, initial encounter [S09.90XA]  Fall, initial encounter [W19.XXXA]  Altered mental status, unspecified altered mental status type [R41.82]    Active and Resolved Hospital Problems:  Active Hospital Problems    Diagnosis POA   • Hyponatremia [E87.1] Yes     Priority: Medium   • SIADH (syndrome of inappropriate ADH production) ([facility]) [E22.2] Yes      Resolved Hospital Problems    Diagnosis POA   • Syncope [R55] Yes     Priority: Medium   • Polypharmacy [Z79.899] Not Applicable     Priority: Medium   • Medication reaction, initial encounter [T50.905A] Yes     Priority: Medium   • Ataxia [R27.0] Yes   • Minor head injury [S09.90XA] Unknown   • Fall [W19.XXXA] Yes   • Altered mental status [R41.82] Yes         Hospital Course     Hospital Course:  Sasha Fuller is a 66 y.o. female who was admitted with syncope at home. On evaluation the ER was found to have hyponatremia. She was on multiple medications including Seroquel and Remeron. Neurologist consulted, Remigio. She gave a history of seizure disorder on Depakote and Topamax. They recommended to continue that. X ray sacrum and coccyx was done. X-ray of the knee showed degenerative changes no fracture. The coccyx was angulated not sure if it was an anatomic variant or fracture. She was anxious to go home and was discharged on 10/10/2021        DISCHARGE Follow Up Recommendations for labs  and diagnostics: Follow-up with PMD in 1 week      Day of Discharge     Vital Signs:  Temp:  [97.5 °F (36.4 °C)-98.4 °F (36.9 °C)] 97.5 °F (36.4 °C)  Heart Rate:  [71-92] 83  Resp:  [18-20] 20  BP: (119-153)/(49-77) 125/51  Physical Exam:  Constitutional: Awake, alert   Eyes: PERRLA, sclerae anicteric, no conjunctival injection   HENT: NCAT, mucous membranes moist   Neck: Supple, no thyromegaly, no lymphadenopathy, trachea midline   Respiratory: Clear to auscultation bilaterally, nonlabored respirations    Cardiovascular: RRR, no murmurs, rubs, or gallops, palpable pedal pulses bilaterally   Gastrointestinal: Positive bowel sounds, soft, nontender, nondistended   Musculoskeletal: No bilateral ankle edema, no clubbing or cyanosis to extremities   Psychiatric: Appropriate affect, cooperative   Neurologic: Oriented x 3, strength symmetric in all extremities, Cranial Nerves grossly intact to confrontation, speech clear   Skin: No rashes       Pertinent  and/or Most Recent Results     LAB RESULTS:      Lab 10/07/21  2134 10/07/21  2013   WBC  --  4.86   HEMOGLOBIN  --  14.5   HEMATOCRIT  --  42.6   PLATELETS  --  147   NEUTROS ABS  --  2.61   IMMATURE GRANS (ABS)  --  0.07*   LYMPHS ABS  --  1.56   MONOS ABS  --  0.43   EOS ABS  --  0.13   MCV  --  92.8   PROTIME 12.8*  --          Lab 10/08/21  0508 10/07/21  2013   SODIUM 129* 121*   POTASSIUM 3.5 3.6   CHLORIDE 91* 86*   CO2 29.6* 21.4*   ANION GAP 8.4 13.6   BUN 9 9   CREATININE 0.81 0.76   GLUCOSE 96 98   CALCIUM 9.1 8.9   MAGNESIUM  --  1.8         Lab 10/08/21  0508 10/07/21  2013   TOTAL PROTEIN 5.9* 5.6*   ALBUMIN 4.10 3.70   GLOBULIN 1.8 1.9   ALT (SGPT) 8 8   AST (SGOT) 9 11   BILIRUBIN 0.2 0.3   ALK PHOS 48 38*         Lab 10/07/21  2134 10/07/21  2013   TROPONIN T <0.010 <0.010   PROTIME 12.8*  --    INR 1.20*  --                  Brief Urine Lab Results  (Last result in the past 365 days)      Color   Clarity   Blood   Leuk Est   Nitrite   Protein   CREAT    Urine HCG        11/23/20 1230   CLEAR   NEGATIVE   NEGATIVE  Comment:  URINE MICROSCOPICS:    Only performed if any of the following are present:    Appearance is Sl Cloudy to Turbid; Protein is    30 to >/= 300 mg/dL; Occult Blood, Nitrite, or Leukocyte    Esterase is positive. Color is Red or Orange.     NEGATIVE                 Microbiology Results (last 10 days)     ** No results found for the last 240 hours. **                           Labs Pending at Discharge:  Pending Labs     Order Current Status    Chloride, Urine, Random - Urine, Clean Catch Collected (10/08/21 0959)    Sodium, Urine, Random - Urine, Clean Catch Collected (10/08/21 0959)    Urinalysis With Culture If Indicated - Collected (10/07/21 2009)            Discharge Details        Discharge Medications      New Medications      Instructions Start Date   aluminum-magnesium hydroxide-simethicone 400-400-40 MG/5ML suspension  Commonly known as: MAALOX MAX   15 mL, Oral, Every 6 Hours PRN      polyethylene glycol 17 g packet  Commonly known as: MIRALAX   17 g, Oral, Daily   Start Date: October 11, 2021        Changes to Medications      Instructions Start Date   QUEtiapine 300 MG tablet  Commonly known as: SEROquel  What changed: Another medication with the same name was removed. Continue taking this medication, and follow the directions you see here.   600 mg, Oral, Nightly         Continue These Medications      Instructions Start Date   aspirin 81 MG chewable tablet   81 mg, Oral, Daily      benztropine 1 MG tablet  Commonly known as: COGENTIN   1 mg, Oral, 2 Times Daily      cetirizine 10 MG tablet  Commonly known as: zyrTEC   10 mg, Oral, Daily      divalproex 500 MG DR tablet  Commonly known as: DEPAKOTE   500 mg, Oral, Every Morning      divalproex 500 MG DR tablet  Commonly known as: DEPAKOTE   1,000 mg, Oral, Every Night at Bedtime      escitalopram 10 MG tablet  Commonly known as: LEXAPRO   10 mg, Oral, Daily      pantoprazole 40 MG EC  tablet  Commonly known as: PROTONIX   40 mg, Oral, Daily      vitamin D 1.25 MG (90169 UT) capsule capsule  Commonly known as: ERGOCALCIFEROL   50,000 Units, Oral, 2 Times Weekly         Stop These Medications    hydroCHLOROthiazide 12.5 MG capsule  Commonly known as: MICROZIDE     mirtazapine 45 MG disintegrating tablet  Commonly known as: REMERON SOL-TAB     topiramate 100 MG tablet  Commonly known as: TOPAMAX            No Known Allergies      Discharge Disposition:  Home or Self Care    Patient Condition on discharge: No further falls    Diet:  Hospital:  Diet Order   Procedures   • Diet Regular         Discharge Activity: As tolerated        CODE STATUS:  Code Status and Medical Interventions:   Ordered at: 10/08/21 0043     Level Of Support Discussed With:    Patient     Code Status:    CPR     Medical Interventions (Level of Support Prior to Arrest):    Full         No future appointments. Follow-up with PMD in 1 week          Electronically signed by Sergio Cristobal MD, 10/10/21, 11:45 AM EDT.      Electronically signed by Sergio Cristobal MD at 10/10/21 6507  CONTACT   JAGRUTI S UTILIZATION REVIEW    [facility]  [address] [place] [address]  TAX ID [id number]  [facility]  [phone]       [phone]   [phone]

## 2021-10-11 NOTE — PAYOR COMM NOTE
"Sasha Fuller (66 y.o. Female)             Date of Birth Social Security Number Address Home Phone MRN    1955  [id number] [place] [address] [phone] [id number]    Hindu Marital Status             [employer] Single       Admission Date Admission Type Admitting Provider Attending Provider Department, Room/Bed    10/7/21 Emergency Torsten Morales MD  [facility] CARE UNIT, 219/1    Discharge Date Discharge Disposition Discharge Destination          10/10/2021 Home or Self Care Home             Attending Provider: (none)   Allergies: No Known Allergies    Isolation: None   Infection: None   Code Status: Prior   Advance Care Planning Activity    Ht: 157.5 cm (62.01\")   Wt: 88.2 kg (194 lb 7.1 oz)    Admission Cmt: None   Principal Problem: None                Active Insurance as of 10/7/2021     Primary Coverage     Payor Plan Insurance Group Employer/Plan Group    [facility] MEDICARE REPLACEMENT WELLCARE MEDICARE REPLACEMENT      Payor Plan Address Payor Plan Phone Number Payor Plan Fax Number Effective Dates    [address] [address] [phone]  10/7/2021 - None Entered    [facility] [id number]       Subscriber Name Subscriber Birth Date Member ID       Sasha Fuller 1955 [id number]           Secondary Coverage     Payor Plan Insurance Group Employer/Plan Group    [facility] WELLCARE MEDICAID      Payor Plan Address Payor Plan Phone Number Payor Plan Fax Number Effective Dates    [address] [address] [phone]  10/7/2021 - None Entered    [facility] [id number]       Subscriber Name Subscriber Birth Date Member ID       SASHA FULLER 1955 [id number]                 Emergency Contacts      (Rel.) Home Phone Work Phone Mobile Phone    TRIXIE GIBSON (Daughter) [phone] -- --            Physician Progress Notes (last 24 hours)  Notes from 10/10/21 1242 through 10/11/21 1242   No notes of this type exist for this encounter.   "       Patrick Flood Jr., MD   Physician   Neurology   Consults       Signed   Date of Service:  10/08/21 2121   Creation Time:  10/08/21 2121              Signed        Expand All Collapse All        Show:Clear all  [x]Manual[x]Template[]Copied    Added by:  [x]Patrick Flood Jr., MD      []Jong for details       [facility]   Consult Note        Patient Name: Sasha Fuller  : 1955  MRN: [id number]  Primary Care Physician:  Provider, No Known  Date of admission: 10/7/2021        Subjective []Expand by Default     Subjective      66 years old woman was seen upon the request of Dr.Ayas Morales for evaluation.     Chief Complaint:   Syncope     HPI:  She dated the onset of her illness sometime during the night of 2021, when she got up from bed, after sitting on the edge of the bed for a while, she lost her balance and was able to lean herself onto the cabinet in front of her.  She she stayed up and motionless momentarily (she could not guesstimate how long she stood up) then she started walking to the restroom.  When she reached the restroom, she fell down.  There was no associated dizziness.  She claims she just fell down for no apparent reason.  Her buttocks landed on the floor first then the back of her head.  She did not lose consciousness.  She was awake all the time.  There were no shaking or jerking movements noted.     She mentioned that she has a seizure disorder since she was 9 years old, and the last time she had a seizure was in .  She is being maintained on unknown amount of Depakote and Topamax.  She could not remember the dosages.        Review of Systems  There is no difficulty seeing, speaking, swallowing and swallowing.  No difficulty in breathing.  There is no chest pains or abdominal pain is persistent incontinent of her urine.  She did not bite her lips or her tongue during and around the time she fell down on 10/7/2021.        Medical History        Past  Medical History:   Diagnosis Date   • GERD (gastroesophageal reflux disease)     • Hypertension     • Seizures (HCC)              Surgical History         Past Surgical History:   Procedure Laterality Date   • BREAST SURGERY       • HYSTERECTOMY                Family History: family history is not on file. Otherwise pertinent FHx was reviewed and not pertinent to current issue.     Social History:  reports that she has been smoking. She has been smoking about 1.00 pack per day. She does not have any smokeless tobacco history on file. She reports previous alcohol use. She reports that she does not use drugs.     She started smoking when she was 4 or 5 years old.  She smoked a pack of cigarettes every day after this time.  She mentioned that her parents added vodka to her milk bottle when she was a baby to make her go to sleep.  She continues to drink alcoholic beverages until the time that she was 15 years old when she stopped drinking altogether.  She does not use street drugs.     Psychosocial History: Not known at this time.     Home Medications:  QUEtiapine, aspirin, benztropine, cetirizine, divalproex, escitalopram, hydroCHLOROthiazide, mirtazapine, pantoprazole, topiramate, and vitamin D        Allergies:  No Known Allergies     Vitals:   Temp:  [97.3 °F (36.3 °C)-98.7 °F (37.1 °C)] 98.6 °F (37 °C)  Heart Rate:  [] 72  Resp:  [16-21] 18  BP: (117-156)/() 144/58  Physical Exam             She was awake and alert was not informed distress.  She was para developed and fairly nourished.  The abdomen is flabby and protuberant.  She is overweight.     Her heart was regular.  Heart rate was 70/min.  There were no murmurs.  The lungs were clear.     The carotid pulses were 1+ and equal.  There were no bruit on either side.    Neurological Examination:  The responses were coherent and relevant.  She was aware of what was going on around her.  She has an insight to her condition.  She was able to understand  and follow verbal command.     I did not look into the fundus on either side because of the COVID-19 pandemic social distancing.     The pupils were 3 to 4 mm there were round and equal reactive to light directly and consensually.  There were no extraocular muscle weakness.     No facial asymmetry.  No facial weakness.  Was able to hear normal conversational speech.     The strength of the sternomastoid and trapezius muscles were normal and symmetrical.  The uvula and the tongue were in the midline.     The strength in both upper and lower extremities were normal and equal.     Felt pinprick equal in both sides of the forehead, face, lower jaw, both upper and lower extremities, and both sides of his trunk.     The deep tendon reflexes were absent on both sides.     Did finger-to-nose test fairly well equal on both sides.              Result Review       Result Review:  I have personally reviewed the results from the time of this admission to 10/8/2021 21:21 EDT and agree with these findings:  [x]?  Laboratory  []?  Microbiology  [x]?  Radiology  []?  EKG/Telemetry   []?  Cardiology/Vascular   []?  Pathology  []?  Old records  []?  Other:  Most notable findings include:   I reviewed the computer images of the CAT scan of her brain was done October 7, 2021.  Study showed no acute changes.  There is moderate cortical atrophy and ventricular dilatation.     We have the report of the EEG that was done on 10/8/2021.  The study was essentially normal.  There were no epileptiform discharges noted.        Impression:    Postural Hypotension  Generalized Seizure Disorder, controlled, none since 2003  Pain, coccyx.  Pain, right knee etiology undetermined.           Plan:   We will do well to table test which can be done on an outpatient basis if she were to go home.     Meanwhile, continue with the present amount of Depakote and Topamax that she has been taking.     We will see what the x-ray of the sacrum and coccyx and the  right knee.     Thank you very much for some see this patient.  I will see the patient as needed.                           Electronically signed by Patrick Flood Jr., MD, 10/08/21, 9:21 PM EDT.                          Routing History          Sergio Cristobal MD   Physician   Specialty:  Hematology and Oncology   Progress Notes       Signed   Date of Service:  10/09/21 1816   Creation Time:  10/09/21 1816              Signed        Expand All Collapse All        Show:Clear all  [x]Manual[x]Template[]Copied    Added by:  [x]Sergio Cristobal MD      []Bravover for details     [facility]     Progress Note     Patient Name: Sasha Fuller  : 1955  MRN: [id number]  Primary Care Physician:  Lei Coreas MD  Date of admission: 10/7/2021        Subjective []Expand by Default     Subjective      Chief Complaint: Post syncope, hyponatremia, history of seizures in the past, postural hypotension, pain in knee and coccyx     HPI:  Patient Reports pain in the knee and coccyx post fall.  Also complains of constipation for more than a week     Review of Systems              Review of Systems   Constitutional: Activity change: in bed.   HENT: Negative.    Eyes: Negative.    Respiratory: Negative.    Cardiovascular: Negative.    Gastrointestinal: Positive for constipation.   Endocrine: Negative.    Genitourinary: Negative.    Musculoskeletal: Negative.    Skin: Negative.    Allergic/Immunologic: Negative.    Neurological: Positive for weakness.   Hematological: Negative.    Psychiatric/Behavioral: Negative.                Objective      Objective      Vitals:   Temp:  [97.7 °F (36.5 °C)-98.6 °F (37 °C)] 98.4 °F (36.9 °C)  Heart Rate:  [67-84] 83  Resp:  [18-20] 18  BP: (123-154)/(57-71) 153/67  Physical Exam                         Constitutional: Awake, alert, oriented cooperative              Eyes: PERRLA, sclerae anicteric, no conjunctival injection              HENT: NCAT, mucous membranes moist               Neck: Supple, no thyromegaly, no lymphadenopathy, trachea midline              Respiratory: Clear to auscultation bilaterally, nonlabored respirations               Cardiovascular: RRR, no murmurs, rubs, or gallops, palpable pedal pulses bilaterally              Gastrointestinal: Positive bowel sounds, soft, nontender, nondistended              Musculoskeletal: No bilateral ankle edema, no clubbing or cyanosis to extremities              Psychiatric: Appropriate affect, cooperative              Neurologic: Oriented x 3, strength symmetric in all extremities, Cranial Nerves grossly intact to confrontation, speech clear              Skin: No rashes            Result Review       Result Review:  I have personally reviewed the results from the time of this admission to 10/9/2021 18:16 EDT and agree with these findings:  [x]?  Laboratory  []?  Microbiology  []?  Radiology  []?  EKG/Telemetry   []?  Cardiology/Vascular   []?  Pathology  []?  Old records  []?  Other:  Most notable findings include: Syncope, hyponatremia, postural hypotension           Assessment/Plan      Assessment / Plan   Syncope  Hyponatremia  Postural hypotension  Constipation  Pain in coccyx and knee after fall     Brief Patient Summary:  Sasha Fuller is a 66 y.o. female who fell at home and found to have hyponatremia     Active Hospital Problems:       Active Hospital Problems     Diagnosis     • Syncope     • Hyponatremia     • Polypharmacy     • Medication reaction, initial encounter        Plan:   X-rays of the coccyx and knee has been ordered by Dr. Remigio Shah for constipation  Fleet enema for constipation  Continue care  Medication adjusted by Dr. Morales      DVT prophylaxis:  Medical DVT prophylaxis orders are present.     CODE STATUS:   Level Of Support Discussed With: Patient  Code Status: CPR  Medical Interventions (Level of Support Prior to Arrest): Full        Electronically signed by Sergio Cristobal MD, 10/09/21, 6:16 PM  EDT.                    Sergio Cristobal MD   Physician   Specialty:  Hematology and Oncology   Discharge Summary       Signed   Date of Service:  10/10/21 1145   Creation Time:  10/10/21 1145              Signed        Expand All Collapse All        Show:Clear all  [x]Manual[x]Template[]Copied    Added by:  [x]Sergio Cristobal MD      [][facility] for details                                                                                      [facility]                                                                           DISCHARGE SUMMARY     Patient Name: Sasha Fuller  : 1955  MRN: [id number]     Date of Admission: 10/7/2021  Date of Discharge: 10/9/2021  Primary Care Physician: Lei Coreas MD             Consults      Date and Time Order Name Status Description     10/8/2021 10:20 AM Inpatient Neurology Consult General         10/7/2021 11:33 PM General MD Inpatient Consult Completed               Presenting Problem:   SIADH (syndrome of inappropriate ADH production) (HCC) [E22.2]  Ataxia [R27.0]  Hyponatremia [E87.1]  Medication reaction, initial encounter [T50.905A]  Minor head injury, initial encounter [S09.90XA]  Fall, initial encounter [W19.XXXA]  Altered mental status, unspecified altered mental status type [R41.82]     Active and Resolved Hospital Problems:        Active Hospital Problems     Diagnosis POA   • Hyponatremia [E87.1] Yes       Priority: Medium   • SIADH (syndrome of inappropriate ADH production) ([facility]) [E22.2] Yes       Resolved Hospital Problems     Diagnosis POA   • Syncope [R55] Yes       Priority: Medium   • Polypharmacy [Z79.899] Not Applicable       Priority: Medium   • Medication reaction, initial encounter [T50.905A] Yes       Priority: Medium   • Ataxia [R27.0] Yes   • Minor head injury [S09.90XA] Unknown   • Fall [W19.XXXA] Yes   • Altered mental status [R41.82] Yes            Hospital Course      Hospital Course:  Sasha Fuller is a 66 y.o. female who was admitted  with syncope at home. On evaluation the ER was found to have hyponatremia. She was on multiple medications including Seroquel and Remeron. Neurologist consulted, Remigio. She gave a history of seizure disorder on Depakote and Topamax. They recommended to continue that. X ray sacrum and coccyx was done. X-ray of the knee showed degenerative changes no fracture. The coccyx was angulated not sure if it was an anatomic variant or fracture. She was anxious to go home and was discharged on 10/10/2021           DISCHARGE Follow Up Recommendations for labs and diagnostics: Follow-up with PMD in 1 week        Day of Discharge      Vital Signs:  Temp:  [97.5 °F (36.4 °C)-98.4 °F (36.9 °C)] 97.5 °F (36.4 °C)  Heart Rate:  [71-92] 83  Resp:  [18-20] 20  BP: (119-153)/(49-77) 125/51  Physical Exam:  Constitutional: Awake, alert              Eyes: PERRLA, sclerae anicteric, no conjunctival injection              HENT: NCAT, mucous membranes moist              Neck: Supple, no thyromegaly, no lymphadenopathy, trachea midline              Respiratory: Clear to auscultation bilaterally, nonlabored respirations               Cardiovascular: RRR, no murmurs, rubs, or gallops, palpable pedal pulses bilaterally              Gastrointestinal: Positive bowel sounds, soft, nontender, nondistended              Musculoskeletal: No bilateral ankle edema, no clubbing or cyanosis to extremities              Psychiatric: Appropriate affect, cooperative              Neurologic: Oriented x 3, strength symmetric in all extremities, Cranial Nerves grossly intact to confrontation, speech clear              Skin: No rashes         Pertinent  and/or Most Recent Results      LAB RESULTS:           Lab 10/07/21  2134 10/07/21  2013   WBC  --  4.86   HEMOGLOBIN  --  14.5   HEMATOCRIT  --  42.6   PLATELETS  --  147   NEUTROS ABS  --  2.61   IMMATURE GRANS (ABS)  --  0.07*   LYMPHS ABS  --  1.56   MONOS ABS  --  0.43   EOS ABS  --  0.13   MCV  --  92.8    PROTIME 12.8*  --                Lab 10/08/21  0508 10/07/21  2013   SODIUM 129* 121*   POTASSIUM 3.5 3.6   CHLORIDE 91* 86*   CO2 29.6* 21.4*   ANION GAP 8.4 13.6   BUN 9 9   CREATININE 0.81 0.76   GLUCOSE 96 98   CALCIUM 9.1 8.9   MAGNESIUM  --  1.8               Lab 10/08/21  0508 10/07/21  2013   TOTAL PROTEIN 5.9* 5.6*   ALBUMIN 4.10 3.70   GLOBULIN 1.8 1.9   ALT (SGPT) 8 8   AST (SGOT) 9 11   BILIRUBIN 0.2 0.3   ALK PHOS 48 38*               Lab 10/07/21  2134 10/07/21  2013   TROPONIN T <0.010 <0.010   PROTIME 12.8*  --    INR 1.20*  --                                         Brief Urine Lab Results  (Last result in the past 365 days)       Color   Clarity   Blood   Leuk Est   Nitrite   Protein   CREAT   Urine HCG         11/23/20 1230     CLEAR    NEGATIVE    NEGATIVE  Comment:  URINE MICROSCOPICS:    Only performed if any of the following are present:    Appearance is Sl Cloudy to Turbid; Protein is    30 to >/= 300 mg/dL; Occult Blood, Nitrite, or Leukocyte    Esterase is positive. Color is Red or Orange.       NEGATIVE                               Microbiology Results (last 10 days)      ** No results found for the last 240 hours. **             Last 30 day radiology impressions                      Labs Pending at Discharge:       Pending Labs      Order Current Status     Chloride, Urine, Random - Urine, Clean Catch Collected (10/08/21 0959)     Sodium, Urine, Random - Urine, Clean Catch Collected (10/08/21 0959)     Urinalysis With Culture If Indicated - Collected (10/07/21 2009)                Discharge Details               Discharge Medications            New Medications      Instructions Start Date   aluminum-magnesium hydroxide-simethicone 400-400-40 MG/5ML suspension  Commonly known as: MAALOX MAX    15 mL, Oral, Every 6 Hours PRN        polyethylene glycol 17 g packet  Commonly known as: MIRALAX    17 g, Oral, Daily    Start Date: October 11, 2021                    Changes to Medications       Instructions Start Date   QUEtiapine 300 MG tablet  Commonly known as: SEROquel  What changed: Another medication with the same name was removed. Continue taking this medication, and follow the directions you see here.    600 mg, Oral, Nightly                      Continue These Medications      Instructions Start Date   aspirin 81 MG chewable tablet    81 mg, Oral, Daily        benztropine 1 MG tablet  Commonly known as: COGENTIN    1 mg, Oral, 2 Times Daily        cetirizine 10 MG tablet  Commonly known as: zyrTEC    10 mg, Oral, Daily        divalproex 500 MG DR tablet  Commonly known as: DEPAKOTE    500 mg, Oral, Every Morning        divalproex 500 MG DR tablet  Commonly known as: DEPAKOTE    1,000 mg, Oral, Every Night at Bedtime        escitalopram 10 MG tablet  Commonly known as: LEXAPRO    10 mg, Oral, Daily        pantoprazole 40 MG EC tablet  Commonly known as: PROTONIX    40 mg, Oral, Daily        vitamin D 1.25 MG (93130 UT) capsule capsule  Commonly known as: ERGOCALCIFEROL    50,000 Units, Oral, 2 Times Weekly            Stop These Medications    hydroCHLOROthiazide 12.5 MG capsule  Commonly known as: MICROZIDE      mirtazapine 45 MG disintegrating tablet  Commonly known as: REMERON SOL-TAB      topiramate 100 MG tablet  Commonly known as: TOPAMAX                No Known Allergies        Discharge Disposition:  Home or Self Care     Patient Condition on discharge: No further falls     Diet:  Hospital:      Diet Order   Procedures   • Diet Regular            Discharge Activity: As tolerated        CODE STATUS:      Code Status and Medical Interventions:   Ordered at: 10/08/21 0043     Level Of Support Discussed With:     Patient     Code Status:     CPR     Medical Interventions (Level of Support Prior to Arrest):     Full            No future appointments. Follow-up with PMD in 1 week           Electronically signed by Sergio Cristobal MD, 10/10/21, 11:45 AM EDT.                     Routing History                                     ++++auth/days pending  dc'd 10/10 +++++    CONTACT   JAGRUTI S UTILIZATION REVIEW    Psychiatric  [address] [place] [address]  TAX ID [id number]  NPI  [id number]       [phone]   [phone]

## 2021-10-31 LAB — QT INTERVAL: 424 MS

## 2023-01-26 ENCOUNTER — TRANSCRIBE ORDERS (OUTPATIENT)
Dept: ADMINISTRATIVE | Facility: HOSPITAL | Age: 68
End: 2023-01-26
Payer: MEDICARE

## 2023-01-26 DIAGNOSIS — Z12.31 SCREENING MAMMOGRAM FOR HIGH-RISK PATIENT: Primary | ICD-10-CM

## 2024-01-26 ENCOUNTER — HOSPITAL ENCOUNTER (EMERGENCY)
Facility: HOSPITAL | Age: 69
Discharge: OTHER INSTITUTION | End: 2024-01-26
Attending: EMERGENCY MEDICINE
Payer: MEDICARE

## 2024-01-26 ENCOUNTER — APPOINTMENT (OUTPATIENT)
Dept: GENERAL RADIOLOGY | Facility: HOSPITAL | Age: 69
End: 2024-01-26
Payer: MEDICARE

## 2024-01-26 VITALS
DIASTOLIC BLOOD PRESSURE: 52 MMHG | SYSTOLIC BLOOD PRESSURE: 114 MMHG | WEIGHT: 183.42 LBS | BODY MASS INDEX: 33.75 KG/M2 | HEIGHT: 62 IN | HEART RATE: 86 BPM | OXYGEN SATURATION: 97 % | TEMPERATURE: 99.4 F | RESPIRATION RATE: 16 BRPM

## 2024-01-26 DIAGNOSIS — F32.A DEPRESSION WITH SUICIDAL IDEATION: Primary | ICD-10-CM

## 2024-01-26 DIAGNOSIS — R45.851 DEPRESSION WITH SUICIDAL IDEATION: Primary | ICD-10-CM

## 2024-01-26 DIAGNOSIS — F29 PSYCHOSIS, UNSPECIFIED PSYCHOSIS TYPE: ICD-10-CM

## 2024-01-26 LAB
ALBUMIN SERPL-MCNC: 4.2 G/DL (ref 3.5–5.2)
ALBUMIN/GLOB SERPL: 1.6 G/DL
ALP SERPL-CCNC: 82 U/L (ref 39–117)
ALT SERPL W P-5'-P-CCNC: 12 U/L (ref 1–33)
AMORPH URATE CRY URNS QL MICRO: ABNORMAL /HPF
AMPHET+METHAMPHET UR QL: NEGATIVE
ANION GAP SERPL CALCULATED.3IONS-SCNC: 11.1 MMOL/L (ref 5–15)
APAP SERPL-MCNC: <5 MCG/ML (ref 0–30)
AST SERPL-CCNC: 10 U/L (ref 1–32)
BACTERIA UR QL AUTO: ABNORMAL /HPF
BARBITURATES UR QL SCN: NEGATIVE
BASOPHILS # BLD AUTO: 0.06 10*3/MM3 (ref 0–0.2)
BASOPHILS NFR BLD AUTO: 0.9 % (ref 0–1.5)
BENZODIAZ UR QL SCN: NEGATIVE
BILIRUB SERPL-MCNC: 0.2 MG/DL (ref 0–1.2)
BILIRUB UR QL STRIP: NEGATIVE
BUN SERPL-MCNC: 9 MG/DL (ref 8–23)
BUN/CREAT SERPL: 9.7 (ref 7–25)
CALCIUM SPEC-SCNC: 9.4 MG/DL (ref 8.6–10.5)
CANNABINOIDS SERPL QL: NEGATIVE
CHLORIDE SERPL-SCNC: 105 MMOL/L (ref 98–107)
CLARITY UR: ABNORMAL
CO2 SERPL-SCNC: 23.9 MMOL/L (ref 22–29)
COCAINE UR QL: NEGATIVE
COLOR UR: YELLOW
CREAT SERPL-MCNC: 0.93 MG/DL (ref 0.57–1)
DEPRECATED RDW RBC AUTO: 44.4 FL (ref 37–54)
EGFRCR SERPLBLD CKD-EPI 2021: 65.8 ML/MIN/1.73
EOSINOPHIL # BLD AUTO: 0.13 10*3/MM3 (ref 0–0.4)
EOSINOPHIL NFR BLD AUTO: 1.9 % (ref 0.3–6.2)
ERYTHROCYTE [DISTWIDTH] IN BLOOD BY AUTOMATED COUNT: 12.8 % (ref 12.3–15.4)
ETHANOL BLD-MCNC: <10 MG/DL (ref 0–10)
ETHANOL UR QL: <0.01 %
FENTANYL UR-MCNC: NEGATIVE NG/ML
FLUAV SUBTYP SPEC NAA+PROBE: NOT DETECTED
FLUBV RNA ISLT QL NAA+PROBE: NOT DETECTED
GLOBULIN UR ELPH-MCNC: 2.6 GM/DL
GLUCOSE SERPL-MCNC: 157 MG/DL (ref 65–99)
GLUCOSE UR STRIP-MCNC: NEGATIVE MG/DL
HCT VFR BLD AUTO: 43.3 % (ref 34–46.6)
HGB BLD-MCNC: 14 G/DL (ref 12–15.9)
HGB UR QL STRIP.AUTO: NEGATIVE
HOLD SPECIMEN: NORMAL
HOLD SPECIMEN: NORMAL
HYALINE CASTS UR QL AUTO: ABNORMAL /LPF
IMM GRANULOCYTES # BLD AUTO: 0.03 10*3/MM3 (ref 0–0.05)
IMM GRANULOCYTES NFR BLD AUTO: 0.4 % (ref 0–0.5)
KETONES UR QL STRIP: NEGATIVE
LEUKOCYTE ESTERASE UR QL STRIP.AUTO: ABNORMAL
LYMPHOCYTES # BLD AUTO: 1.55 10*3/MM3 (ref 0.7–3.1)
LYMPHOCYTES NFR BLD AUTO: 23 % (ref 19.6–45.3)
MAGNESIUM SERPL-MCNC: 2 MG/DL (ref 1.6–2.4)
MCH RBC QN AUTO: 30.6 PG (ref 26.6–33)
MCHC RBC AUTO-ENTMCNC: 32.3 G/DL (ref 31.5–35.7)
MCV RBC AUTO: 94.5 FL (ref 79–97)
METHADONE UR QL SCN: NEGATIVE
MONOCYTES # BLD AUTO: 0.47 10*3/MM3 (ref 0.1–0.9)
MONOCYTES NFR BLD AUTO: 7 % (ref 5–12)
NEUTROPHILS NFR BLD AUTO: 4.49 10*3/MM3 (ref 1.7–7)
NEUTROPHILS NFR BLD AUTO: 66.8 % (ref 42.7–76)
NITRITE UR QL STRIP: POSITIVE
NRBC BLD AUTO-RTO: 0 /100 WBC (ref 0–0.2)
OPIATES UR QL: NEGATIVE
OXYCODONE UR QL SCN: NEGATIVE
PH UR STRIP.AUTO: 6.5 [PH] (ref 5–8)
PLATELET # BLD AUTO: 254 10*3/MM3 (ref 140–450)
PMV BLD AUTO: 10.1 FL (ref 6–12)
POTASSIUM SERPL-SCNC: 4.3 MMOL/L (ref 3.5–5.2)
PROT SERPL-MCNC: 6.8 G/DL (ref 6–8.5)
PROT UR QL STRIP: NEGATIVE
RBC # BLD AUTO: 4.58 10*6/MM3 (ref 3.77–5.28)
RBC # UR STRIP: ABNORMAL /HPF
REF LAB TEST METHOD: ABNORMAL
RSV RNA NPH QL NAA+NON-PROBE: NOT DETECTED
SALICYLATES SERPL-MCNC: <0.3 MG/DL
SARS-COV-2 RNA RESP QL NAA+PROBE: NOT DETECTED
SODIUM SERPL-SCNC: 140 MMOL/L (ref 136–145)
SP GR UR STRIP: 1.01 (ref 1–1.03)
SQUAMOUS #/AREA URNS HPF: ABNORMAL /HPF
TSH SERPL DL<=0.05 MIU/L-ACNC: 1.64 UIU/ML (ref 0.27–4.2)
UROBILINOGEN UR QL STRIP: ABNORMAL
WBC # UR STRIP: ABNORMAL /HPF
WBC NRBC COR # BLD AUTO: 6.73 10*3/MM3 (ref 3.4–10.8)
WHOLE BLOOD HOLD COAG: NORMAL
WHOLE BLOOD HOLD SPECIMEN: NORMAL

## 2024-01-26 PROCEDURE — 25810000003 SODIUM CHLORIDE 0.9 % SOLUTION

## 2024-01-26 PROCEDURE — 36415 COLL VENOUS BLD VENIPUNCTURE: CPT | Performed by: EMERGENCY MEDICINE

## 2024-01-26 PROCEDURE — 71045 X-RAY EXAM CHEST 1 VIEW: CPT

## 2024-01-26 PROCEDURE — 80307 DRUG TEST PRSMV CHEM ANLYZR: CPT | Performed by: EMERGENCY MEDICINE

## 2024-01-26 PROCEDURE — 93010 ELECTROCARDIOGRAM REPORT: CPT | Performed by: INTERNAL MEDICINE

## 2024-01-26 PROCEDURE — 99284 EMERGENCY DEPT VISIT MOD MDM: CPT

## 2024-01-26 PROCEDURE — 93005 ELECTROCARDIOGRAM TRACING: CPT

## 2024-01-26 PROCEDURE — 25010000002 CEFTRIAXONE PER 250 MG

## 2024-01-26 PROCEDURE — 82077 ASSAY SPEC XCP UR&BREATH IA: CPT | Performed by: EMERGENCY MEDICINE

## 2024-01-26 PROCEDURE — 80179 DRUG ASSAY SALICYLATE: CPT | Performed by: EMERGENCY MEDICINE

## 2024-01-26 PROCEDURE — 84443 ASSAY THYROID STIM HORMONE: CPT

## 2024-01-26 PROCEDURE — 80143 DRUG ASSAY ACETAMINOPHEN: CPT | Performed by: EMERGENCY MEDICINE

## 2024-01-26 PROCEDURE — 85025 COMPLETE CBC W/AUTO DIFF WBC: CPT | Performed by: EMERGENCY MEDICINE

## 2024-01-26 PROCEDURE — 87637 SARSCOV2&INF A&B&RSV AMP PRB: CPT

## 2024-01-26 PROCEDURE — 80053 COMPREHEN METABOLIC PANEL: CPT | Performed by: EMERGENCY MEDICINE

## 2024-01-26 PROCEDURE — 81001 URINALYSIS AUTO W/SCOPE: CPT

## 2024-01-26 PROCEDURE — 96365 THER/PROPH/DIAG IV INF INIT: CPT

## 2024-01-26 PROCEDURE — 83735 ASSAY OF MAGNESIUM: CPT

## 2024-01-26 RX ORDER — SODIUM CHLORIDE 0.9 % (FLUSH) 0.9 %
10 SYRINGE (ML) INJECTION AS NEEDED
Status: DISCONTINUED | OUTPATIENT
Start: 2024-01-26 | End: 2024-01-27 | Stop reason: HOSPADM

## 2024-01-26 RX ADMIN — CEFTRIAXONE SODIUM 1000 MG: 1 INJECTION, POWDER, FOR SOLUTION INTRAMUSCULAR; INTRAVENOUS at 21:13

## 2024-01-26 RX ADMIN — SODIUM CHLORIDE 1000 ML: 9 INJECTION, SOLUTION INTRAVENOUS at 21:12

## 2024-01-26 RX ADMIN — QUETIAPINE FUMARATE 300 MG: 200 TABLET, EXTENDED RELEASE ORAL at 21:56

## 2024-01-26 NOTE — ED PROVIDER NOTES
Time: 2:56 PM EST  Date of encounter:  2024  Independent Historian/Clinical History and Information was obtained by:   Patient and Family    History is limited by: N/A    Chief Complaint   Patient presents with    Suicidal         History of Present Illness:  Patient is a 69 y.o. year old female who presents to the emergency department for evaluation of suicidal ideations.  Patient states she has been having suicidal ideation for the past 2 weeks with a plan of wanting to overdose on pills.  She states that her dog  2 weeks ago and this was what triggered her symptoms.  Patient does states she has history of bipolar disorder but has been taking her medications as directed.  (Provider in triage, Prasad Guy PA-C)    Patient Care Team  Primary Care Provider: Lei Coreas MD    Past Medical History:     No Known Allergies  Past Medical History:   Diagnosis Date    GERD (gastroesophageal reflux disease)     Hypertension     Seizures      Past Surgical History:   Procedure Laterality Date    BREAST SURGERY      HYSTERECTOMY       History reviewed. No pertinent family history.    Home Medications:  Prior to Admission medications    Medication Sig Start Date End Date Taking? Authorizing Provider   aspirin 81 MG chewable tablet Chew 81 mg Daily.    Elida Donis MD   benztropine (COGENTIN) 1 MG tablet Take 1 mg by mouth 2 (Two) Times a Day.    Elida Donis MD   cetirizine (zyrTEC) 10 MG tablet Take 10 mg by mouth Daily.    Elida Donis MD   divalproex (DEPAKOTE) 500 MG DR tablet Take 500 mg by mouth Every Morning.    Elida Donis MD   divalproex (DEPAKOTE) 500 MG DR tablet Take 1,000 mg by mouth every night at bedtime.    Elida Donis MD   escitalopram (LEXAPRO) 10 MG tablet Take 10 mg by mouth Daily.    Elida Donis MD   pantoprazole (PROTONIX) 40 MG EC tablet Take 40 mg by mouth Daily.    Elida Donis MD   QUEtiapine (SEROquel) 300 MG tablet Take  "600 mg by mouth Every Night.    ProviderElida MD   vitamin D (ERGOCALCIFEROL) 1.25 MG (77288 UT) capsule capsule Take 50,000 Units by mouth 2 (Two) Times a Week.    ProviderElida MD        Social History:   Social History     Tobacco Use    Smoking status: Every Day     Packs/day: 1     Types: Cigarettes   Substance Use Topics    Alcohol use: Not Currently    Drug use: Never         Review of Systems:  Review of Systems   Psychiatric/Behavioral:  Positive for suicidal ideas.         Physical Exam:  /52 (BP Location: Right arm, Patient Position: Sitting)   Pulse 86   Temp 99.4 °F (37.4 °C) (Oral)   Resp 16   Ht 157.5 cm (62\")   Wt 83.2 kg (183 lb 6.8 oz)   SpO2 97%   BMI 33.55 kg/m²         Physical Exam  Vitals and nursing note reviewed.   Constitutional:       General: She is not in acute distress.     Appearance: Normal appearance. She is not toxic-appearing.   HENT:      Head: Normocephalic and atraumatic.      Mouth/Throat:      Mouth: Mucous membranes are moist.   Eyes:      General: No scleral icterus.     Pupils: Pupils are equal, round, and reactive to light.   Cardiovascular:      Rate and Rhythm: Normal rate and regular rhythm.      Pulses: Normal pulses.      Heart sounds: Normal heart sounds.   Pulmonary:      Effort: Pulmonary effort is normal. No respiratory distress.      Breath sounds: Normal breath sounds.   Abdominal:      General: Abdomen is flat. There is no distension.      Palpations: Abdomen is soft.      Tenderness: There is no abdominal tenderness.   Musculoskeletal:         General: Normal range of motion.      Cervical back: Normal range of motion and neck supple.   Skin:     General: Skin is warm and dry.   Neurological:      General: No focal deficit present.      Mental Status: She is alert and oriented to person, place, and time. Mental status is at baseline.   Psychiatric:         Mood and Affect: Mood is depressed.         Thought Content: Thought content " is paranoid. Thought content includes suicidal ideation. Thought content does not include homicidal ideation. Thought content includes suicidal plan. Thought content does not include homicidal plan.                    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    The following orders were placed and all results were independently analyzed by me:  Orders Placed This Encounter   Procedures    COVID-19, FLU A/B, RSV PCR 1 HR TAT - Swab, Nasopharynx    XR Chest 1 View    [place] Draw    Comprehensive Metabolic Panel    Acetaminophen Level    Ethanol    Salicylate Level    Urine Drug Screen - Urine, Clean Catch    CBC Auto Differential    TSH Rfx On Abnormal To Free T4    Magnesium    Urinalysis With Microscopic If Indicated (No Culture) - Urine, Clean Catch    Urinalysis, Microscopic Only - Urine, Clean Catch    NPO Diet NPO Type: Strict NPO    Continuous Pulse Oximetry    Vital Signs    Undress & Gown    Make sure that psych referrals were placed and update me  Misc Nursing Order (Specify)    Psych / Access to See    Oxygen Therapy- Nasal Cannula; Titrate 1-6 LPM Per SpO2; 90 - 95%    POC Glucose Once    ECG 12 Lead Other; placement    Insert Peripheral IV    Suicide Precautions    CBC & Differential    Green Top (Gel)    Lavender Top    Gold Top - SST    Light Blue Top       Medications Given in the Emergency Department:  Medications   sodium chloride 0.9 % flush 10 mL (has no administration in time range)   sodium chloride 0.9 % bolus 1,000 mL (1,000 mL Intravenous New Bag 1/26/24 2112)   cefTRIAXone (ROCEPHIN) 1000 mg/50 mL 0.9% sodium chloride MBP (1,000 mg Intravenous New Bag 1/26/24 2113)   QUEtiapine fumarate ER (SEROquel XR) tablet 300 mg (has no administration in time  range)        ED Course:    The patient was initially evaluated in the triage area where orders were placed. The patient was later dispositioned by BREN Oropeza.      The patient was advised to stay for completion of workup which includes but is not limited to communication of labs and radiological results, reassessment and plan. The patient was advised that leaving prior to disposition by a provider could result in critical findings that are not communicated to the patient.     ED Course as of 01/26/24 2130   Fri Jan 26, 2024   1457 PROVIDER IN TRIAGE  Patient was evaluated by me in triage, Prasad Guy PA-C.  Orders were placed and patient is currently awaiting final results and disposition.  [MD]      ED Course User Index  [MD] Prasad Guy PA-C       Labs:    Lab Results (last 24 hours)       Procedure Component Value Units Date/Time    CBC & Differential [[id number]]  (Normal) Collected: 01/26/24 1504    Specimen: Blood from Arm, Right Updated: 01/26/24 1524    Narrative:      The following orders were created for panel order CBC & Differential.  Procedure                               Abnormality         Status                     ---------                               -----------         ------                     CBC Auto Differential[[id number]]        Normal              Final result                 Please view results for these tests on the individual orders.    Comprehensive Metabolic Panel [[id number]]  (Abnormal) Collected: 01/26/24 1504    Specimen: Blood from Arm, Right Updated: 01/26/24 1550     Glucose 157 mg/dL      BUN 9 mg/dL      Creatinine 0.93 mg/dL      Sodium 140 mmol/L      Potassium 4.3 mmol/L      Chloride 105 mmol/L      CO2 23.9 mmol/L      Calcium 9.4 mg/dL      Total Protein 6.8 g/dL      Albumin 4.2 g/dL      ALT (SGPT) 12 U/L      AST (SGOT) 10 U/L      Alkaline Phosphatase 82 U/L      Total Bilirubin 0.2 mg/dL      Globulin 2.6 gm/dL      A/G Ratio 1.6 g/dL       BUN/Creatinine Ratio 9.7     Anion Gap 11.1 mmol/L      eGFR 65.8 mL/min/1.73     Narrative:      GFR Normal >60  Chronic Kidney Disease <60  Kidney Failure <15    The GFR formula is only valid for adults with stable renal function between ages 18 and 70.    Acetaminophen Level [[id number]]  (Normal) Collected: 01/26/24 1504    Specimen: Blood from Arm, Right Updated: 01/26/24 1550     Acetaminophen <5.0 mcg/mL     Ethanol [[id number]] Collected: 01/26/24 1504    Specimen: Blood from Arm, Right Updated: 01/26/24 1550     Ethanol <10 mg/dL      Ethanol % <0.010 %     Narrative:      Ethanol (Plasma)  <10 Essentially Negative    Toxic Concentrations           mg/dL    Flushing, slowing of reflexes    Impaired visual activity         Depression of CNS              >100  Possible Coma                  >300       Salicylate Level [[id number]]  (Normal) Collected: 01/26/24 1504    Specimen: Blood from Arm, Right Updated: 01/26/24 1550     Salicylate <0.3 mg/dL     CBC Auto Differential [[id number]]  (Normal) Collected: 01/26/24 1504    Specimen: Blood from Arm, Right Updated: 01/26/24 1524     WBC 6.73 10*3/mm3      RBC 4.58 10*6/mm3      Hemoglobin 14.0 g/dL      Hematocrit 43.3 %      MCV 94.5 fL      MCH 30.6 pg      MCHC 32.3 g/dL      RDW 12.8 %      RDW-SD 44.4 fl      MPV 10.1 fL      Platelets 254 10*3/mm3      Neutrophil % 66.8 %      Lymphocyte % 23.0 %      Monocyte % 7.0 %      Eosinophil % 1.9 %      Basophil % 0.9 %      Immature Grans % 0.4 %      Neutrophils, Absolute 4.49 10*3/mm3      Lymphocytes, Absolute 1.55 10*3/mm3      Monocytes, Absolute 0.47 10*3/mm3      Eosinophils, Absolute 0.13 10*3/mm3      Basophils, Absolute 0.06 10*3/mm3      Immature Grans, Absolute 0.03 10*3/mm3      nRBC 0.0 /100 WBC     TSH Rfx On Abnormal To Free T4 [[id number]]  (Normal) Collected: 01/26/24 1504    Specimen: Blood from Arm, Right Updated: 01/26/24 2024     TSH 1.640 uIU/mL     Magnesium [[id number]]  (Normal)  Collected: 01/26/24 1504    Specimen: Blood from Arm, Right Updated: 01/26/24 2015     Magnesium 2.0 mg/dL     Urine Drug Screen - Urine, Clean Catch [[id number]]  (Normal) Collected: 01/26/24 1551    Specimen: Urine, Clean Catch Updated: 01/26/24 1619     Amphet/Methamphet, Screen Negative     Barbiturates Screen, Urine Negative     Benzodiazepine Screen, Urine Negative     Cocaine Screen, Urine Negative     Opiate Screen Negative     THC, Screen, Urine Negative     Methadone Screen, Urine Negative     Oxycodone Screen, Urine Negative     Fentanyl, Urine Negative    Narrative:      Negative Thresholds Per Drugs Screened:    Amphetamines                 500 ng/ml  Barbiturates                 200 ng/ml  Benzodiazepines              100 ng/ml  Cocaine                      300 ng/ml  Methadone                    300 ng/ml  Opiates                      300 ng/ml  Oxycodone                    100 ng/ml  THC                           50 ng/ml  Fentanyl                       5 ng/ml      The Normal Value for all drugs tested is negative. This report includes final unconfirmed screening results to be used for medical treatment purposes only. Unconfirmed results must not be used for non-medical purposes such as employment or legal testing. Clinical consideration should be applied to any drug of abuse test, particularly when unconfirmed results are used.            Urinalysis With Microscopic If Indicated (No Culture) - Urine, Clean Catch [[id number]]  (Abnormal) Collected: 01/26/24 1551    Specimen: Urine, Clean Catch Updated: 01/1955     Color, UA Yellow     Appearance, UA Cloudy     pH, UA 6.5     Specific Gravity, UA 1.006     Glucose, UA Negative     Ketones, UA Negative     Bilirubin, UA Negative     Blood, UA Negative     Protein, UA Negative     Leuk Esterase, UA Large (3+)     Nitrite, UA Positive     Urobilinogen, UA 0.2 E.U./dL    Urinalysis, Microscopic Only - Urine, Clean Catch [[id number]]  (Abnormal)  Collected: 01/26/24 1551    Specimen: Urine, Clean Catch Updated: 01/26/24 2025     RBC, UA 3-5 /HPF      WBC, UA Too Numerous to Count /HPF      Bacteria, UA 3+ /HPF      Squamous Epithelial Cells, UA 3-6 /HPF      Hyaline Casts, UA None Seen /LPF      Amorphous Crystals, UA Small/1+ /HPF      Methodology Manual Light Microscopy    COVID-19, FLU A/B, RSV PCR 1 HR TAT - Swab, Nasopharynx [[id number]]  (Normal) Collected: 01/26/24 2000    Specimen: Swab from Nasopharynx Updated: 01/26/24 2041     COVID19 Not Detected     Influenza A PCR Not Detected     Influenza B PCR Not Detected     RSV, PCR Not Detected    Narrative:      Fact sheet for providers: https://www.fda.gov/media/068591/download    Fact sheet for patients: https://www.fda.gov/media/526672/download    Test performed by PCR.             Imaging:    XR Chest 1 View    Result Date: 1/26/2024  PROCEDURE: XR CHEST 1 VW  COMPARISON: [facility], , XR CHEST 1 VW, 10/07/2021, 20:27.  INDICATIONS: placement  mental health   smoker for 60 years  cough  FINDINGS:  LUNGS: Normal.  No significant pulmonary parenchymal abnormalities.  VASCULATURE: Normal.  Unremarkable pulmonary vasculature.  CARDIAC: Normal.  No cardiac silhouette abnormality or cardiomegaly.  MEDIASTINUM: Normal.  No visible mass or adenopathy.  PLEURA: Normal.  No effusion or pleural thickening.  BONES: Normal.  No fracture or visible bony lesion.  OTHER: Negative.        No acute cardiopulmonary abnormality.      ADRIAN SELLERS MD       Electronically Signed and Approved By: ADRIAN SELLERS MD on 1/26/2024 at 20:00                Differential Diagnosis and Discussion:      Psychiatric: Differential diagnosis includes but is not limited to depression, psychosis, bipolar disorder, anxiety, manic episode, schizophrenia, and substance abuse.    All labs were reviewed and interpreted by me.  All X-rays impressions were independently interpreted by me.    MDM     Amount and/or Complexity of Data  Reviewed  Clinical lab tests: reviewed  Tests in the radiology section of CPT®: reviewed  Tests in the medicine section of CPT®: reviewed    Risk of Complications, Morbidity, and/or Mortality  Presenting problems: moderate  Diagnostic procedures: moderate  Management options: moderate           Patient Care Considerations:    PSYCH: I considered ordering anxiolytic and or antipsychotic medications, however patient was able to facilitate the medical screening exam and disposition without further medications.      Consultants/Shared Management Plan:    None    Social Determinants of Health:    Patient is independent, reliable, and has access to care.       Disposition and Care Coordination:    Transferred: Through independent evaluation of the patient's history, physical, and imperical data, the patient meets criteria to be transferred to another hospital for evaluation/admission.        Final diagnoses:   Depression with suicidal ideation   Psychosis, unspecified psychosis type        ED Disposition       ED Disposition   Transfer to Another Facility     Condition   --    Comment   --               This medical record created using voice recognition software.             Carol Wang, APRN  01/26/24 [id number]

## 2024-01-27 LAB
QT INTERVAL: 396 MS
QTC INTERVAL: 455 MS

## 2024-01-27 NOTE — ED PROVIDER NOTES
"SHARED VISIT NOTE:    Patient is 69 y.o. year old female that presents to the ED for evaluation of suicidal ideation..         ED Course:    /52 (BP Location: Right arm, Patient Position: Sitting)   Pulse 86   Temp 99.4 °F (37.4 °C) (Oral)   Resp 16   Ht 157.5 cm (62\")   Wt 83.2 kg (183 lb 6.8 oz)   SpO2 97%   BMI 33.55 kg/m²   Results for orders placed or performed during the hospital encounter of 01/26/24   COVID-19, FLU A/B, RSV PCR 1 HR TAT - Swab, Nasopharynx    Specimen: Nasopharynx; Swab   Result Value Ref Range    COVID19 Not Detected Not Detected - Ref. Range    Influenza A PCR Not Detected Not Detected    Influenza B PCR Not Detected Not Detected    RSV, PCR Not Detected Not Detected   Comprehensive Metabolic Panel    Specimen: Arm, Right; Blood   Result Value Ref Range    Glucose 157 (H) 65 - 99 mg/dL    BUN 9 8 - 23 mg/dL    Creatinine 0.93 0.57 - 1.00 mg/dL    Sodium 140 136 - 145 mmol/L    Potassium 4.3 3.5 - 5.2 mmol/L    Chloride 105 98 - 107 mmol/L    CO2 23.9 22.0 - 29.0 mmol/L    Calcium 9.4 8.6 - 10.5 mg/dL    Total Protein 6.8 6.0 - 8.5 g/dL    Albumin 4.2 3.5 - 5.2 g/dL    ALT (SGPT) 12 1 - 33 U/L    AST (SGOT) 10 1 - 32 U/L    Alkaline Phosphatase 82 39 - 117 U/L    Total Bilirubin 0.2 0.0 - 1.2 mg/dL    Globulin 2.6 gm/dL    A/G Ratio 1.6 g/dL    BUN/Creatinine Ratio 9.7 7.0 - 25.0    Anion Gap 11.1 5.0 - 15.0 mmol/L    eGFR 65.8 >60.0 mL/min/1.73   Acetaminophen Level    Specimen: Arm, Right; Blood   Result Value Ref Range    Acetaminophen <5.0 0.0 - 30.0 mcg/mL   Ethanol    Specimen: Arm, Right; Blood   Result Value Ref Range    Ethanol <10 0 - 10 mg/dL    Ethanol % <0.010 %   Salicylate Level    Specimen: Arm, Right; Blood   Result Value Ref Range    Salicylate <0.3 <=30.0 mg/dL   Urine Drug Screen - Urine, Clean Catch    Specimen: Urine, Clean Catch   Result Value Ref Range    Amphet/Methamphet, Screen Negative Negative    Barbiturates Screen, Urine Negative Negative    " Benzodiazepine Screen, Urine Negative Negative    Cocaine Screen, Urine Negative Negative    Opiate Screen Negative Negative    THC, Screen, Urine Negative Negative    Methadone Screen, Urine Negative Negative    Oxycodone Screen, Urine Negative Negative    Fentanyl, Urine Negative Negative   CBC Auto Differential    Specimen: Arm, Right; Blood   Result Value Ref Range    WBC 6.73 3.40 - 10.80 10*3/mm3    RBC 4.58 3.77 - 5.28 10*6/mm3    Hemoglobin 14.0 12.0 - 15.9 g/dL    Hematocrit 43.3 34.0 - 46.6 %    MCV 94.5 79.0 - 97.0 fL    MCH 30.6 26.6 - 33.0 pg    MCHC 32.3 31.5 - 35.7 g/dL    RDW 12.8 12.3 - 15.4 %    RDW-SD 44.4 37.0 - 54.0 fl    MPV 10.1 6.0 - 12.0 fL    Platelets 254 140 - 450 10*3/mm3    Neutrophil % 66.8 42.7 - 76.0 %    Lymphocyte % 23.0 19.6 - 45.3 %    Monocyte % 7.0 5.0 - 12.0 %    Eosinophil % 1.9 0.3 - 6.2 %    Basophil % 0.9 0.0 - 1.5 %    Immature Grans % 0.4 0.0 - 0.5 %    Neutrophils, Absolute 4.49 1.70 - 7.00 10*3/mm3    Lymphocytes, Absolute 1.55 0.70 - 3.10 10*3/mm3    Monocytes, Absolute 0.47 0.10 - 0.90 10*3/mm3    Eosinophils, Absolute 0.13 0.00 - 0.40 10*3/mm3    Basophils, Absolute 0.06 0.00 - 0.20 10*3/mm3    Immature Grans, Absolute 0.03 0.00 - 0.05 10*3/mm3    nRBC 0.0 0.0 - 0.2 /100 WBC   TSH Rfx On Abnormal To Free T4    Specimen: Arm, Right; Blood   Result Value Ref Range    TSH 1.640 0.270 - 4.200 uIU/mL   Magnesium    Specimen: Arm, Right; Blood   Result Value Ref Range    Magnesium 2.0 1.6 - 2.4 mg/dL   Urinalysis With Microscopic If Indicated (No Culture) - Urine, Clean Catch    Specimen: Urine, Clean Catch   Result Value Ref Range    Color, UA Yellow Yellow, Straw    Appearance, UA Cloudy (A) Clear    pH, UA 6.5 5.0 - 8.0    Specific Gravity, UA 1.006 1.005 - 1.030    Glucose, UA Negative Negative    Ketones, UA Negative Negative    Bilirubin, UA Negative Negative    Blood, UA Negative Negative    Protein, UA Negative Negative    Leuk Esterase, UA Large (3+) (A) Negative     Nitrite, UA Positive (A) Negative    Urobilinogen, UA 0.2 E.U./dL 0.2 - 1.0 E.U./dL   Urinalysis, Microscopic Only - Urine, Clean Catch    Specimen: Urine, Clean Catch   Result Value Ref Range    RBC, UA 3-5 (A) None Seen, 0-2 /HPF    WBC, UA Too Numerous to Count (A) None Seen, 0-2 /HPF    Bacteria, UA 3+ (A) None Seen /HPF    Squamous Epithelial Cells, UA 3-6 (A) None Seen, 0-2 /HPF    Hyaline Casts, UA None Seen None Seen /LPF    Amorphous Crystals, UA Small/1+ None Seen /HPF    Methodology Manual Light Microscopy    ECG 12 Lead Other; placement   Result Value Ref Range    QT Interval 396 ms    QTC Interval 455 ms   Green Top (Gel)   Result Value Ref Range    Extra Tube Hold for add-ons.    Lavender Top   Result Value Ref Range    Extra Tube hold for add-on    Gold Top - SST   Result Value Ref Range    Extra Tube Hold for add-ons.    Light Blue Top   Result Value Ref Range    Extra Tube Hold for add-ons.      Medications   sodium chloride 0.9 % bolus 1,000 mL (0 mL Intravenous Stopped 1/26/24 2327)   cefTRIAXone (ROCEPHIN) 1000 mg/50 mL 0.9% sodium chloride MBP (0 mg Intravenous Stopped 1/26/24 2327)     XR Chest 1 View    Result Date: 1/26/2024  Narrative: PROCEDURE: XR CHEST 1 VW  COMPARISON: [facility], CR, XR CHEST 1 VW, 10/07/2021, 20:27.  INDICATIONS: placement  mental health   smoker for 60 years  cough  FINDINGS:  LUNGS: Normal.  No significant pulmonary parenchymal abnormalities.  VASCULATURE: Normal.  Unremarkable pulmonary vasculature.  CARDIAC: Normal.  No cardiac silhouette abnormality or cardiomegaly.  MEDIASTINUM: Normal.  No visible mass or adenopathy.  PLEURA: Normal.  No effusion or pleural thickening.  BONES: Normal.  No fracture or visible bony lesion.  OTHER: Negative.       Impression:  No acute cardiopulmonary abnormality.      ADRIAN SELLERS MD       Electronically Signed and Approved By: ADRIAN SELLERS MD on 1/26/2024 at 20:00              MDM:    Procedures          SHARED  VISIT ATTESTATION:    This visit was performed by both myself and an APC.  I performed the substantive portion of the medical decision making. The management plan was made or approved by me, and I take responsibility for patient management.           Benigno Gonsalves,   02:58 EST  01/27/24         Benigno Gonsalves,   01/27/24 0258

## 2024-07-23 ENCOUNTER — HOSPITAL ENCOUNTER (EMERGENCY)
Facility: HOSPITAL | Age: 69
Discharge: HOME | End: 2024-07-23
Attending: EMERGENCY MEDICINE
Payer: MEDICARE

## 2024-07-23 ENCOUNTER — APPOINTMENT (OUTPATIENT)
Dept: GENERAL RADIOLOGY | Facility: HOSPITAL | Age: 69
End: 2024-07-23
Payer: MEDICARE

## 2024-07-23 VITALS
OXYGEN SATURATION: 94 % | BODY MASS INDEX: 34.81 KG/M2 | TEMPERATURE: 98.7 F | SYSTOLIC BLOOD PRESSURE: 125 MMHG | HEIGHT: 62 IN | WEIGHT: 189.15 LBS | HEART RATE: 78 BPM | DIASTOLIC BLOOD PRESSURE: 61 MMHG | RESPIRATION RATE: 17 BRPM

## 2024-07-23 DIAGNOSIS — R53.1 WEAKNESS: Primary | ICD-10-CM

## 2024-07-23 DIAGNOSIS — F31.9 BIPOLAR AFFECTIVE DISORDER, REMISSION STATUS UNSPECIFIED: ICD-10-CM

## 2024-07-23 LAB
ALBUMIN SERPL-MCNC: 4.2 G/DL (ref 3.5–5.2)
ALBUMIN/GLOB SERPL: 1.8 G/DL
ALP SERPL-CCNC: 84 U/L (ref 39–117)
ALT SERPL W P-5'-P-CCNC: 16 U/L (ref 1–33)
AMPHET+METHAMPHET UR QL: NEGATIVE
ANION GAP SERPL CALCULATED.3IONS-SCNC: 11.4 MMOL/L (ref 5–15)
APAP SERPL-MCNC: <5 MCG/ML (ref 0–30)
AST SERPL-CCNC: 17 U/L (ref 1–32)
BACTERIA UR QL AUTO: ABNORMAL /HPF
BARBITURATES UR QL SCN: NEGATIVE
BASOPHILS # BLD AUTO: 0.05 10*3/MM3 (ref 0–0.2)
BASOPHILS NFR BLD AUTO: 0.8 % (ref 0–1.5)
BENZODIAZ UR QL SCN: POSITIVE
BILIRUB SERPL-MCNC: 0.4 MG/DL (ref 0–1.2)
BILIRUB UR QL STRIP: NEGATIVE
BUN SERPL-MCNC: 9 MG/DL (ref 8–23)
BUN/CREAT SERPL: 9.4 (ref 7–25)
CALCIUM SPEC-SCNC: 9.4 MG/DL (ref 8.6–10.5)
CANNABINOIDS SERPL QL: NEGATIVE
CHLORIDE SERPL-SCNC: 100 MMOL/L (ref 98–107)
CLARITY UR: CLEAR
CO2 SERPL-SCNC: 24.6 MMOL/L (ref 22–29)
COCAINE UR QL: NEGATIVE
COLOR UR: YELLOW
CREAT SERPL-MCNC: 0.96 MG/DL (ref 0.57–1)
DEPRECATED RDW RBC AUTO: 43.8 FL (ref 37–54)
EGFRCR SERPLBLD CKD-EPI 2021: 63.4 ML/MIN/1.73
EOSINOPHIL # BLD AUTO: 0.08 10*3/MM3 (ref 0–0.4)
EOSINOPHIL NFR BLD AUTO: 1.2 % (ref 0.3–6.2)
ERYTHROCYTE [DISTWIDTH] IN BLOOD BY AUTOMATED COUNT: 12.9 % (ref 12.3–15.4)
ETHANOL BLD-MCNC: <10 MG/DL (ref 0–10)
ETHANOL UR QL: <0.01 %
FENTANYL UR-MCNC: NEGATIVE NG/ML
GLOBULIN UR ELPH-MCNC: 2.4 GM/DL
GLUCOSE SERPL-MCNC: 146 MG/DL (ref 65–99)
GLUCOSE UR STRIP-MCNC: NEGATIVE MG/DL
HCT VFR BLD AUTO: 39.3 % (ref 34–46.6)
HGB BLD-MCNC: 13 G/DL (ref 12–15.9)
HGB UR QL STRIP.AUTO: NEGATIVE
HOLD SPECIMEN: NORMAL
HOLD SPECIMEN: NORMAL
HYALINE CASTS UR QL AUTO: ABNORMAL /LPF
IMM GRANULOCYTES # BLD AUTO: 0.01 10*3/MM3 (ref 0–0.05)
IMM GRANULOCYTES NFR BLD AUTO: 0.2 % (ref 0–0.5)
KETONES UR QL STRIP: NEGATIVE
LEUKOCYTE ESTERASE UR QL STRIP.AUTO: ABNORMAL
LYMPHOCYTES # BLD AUTO: 1.14 10*3/MM3 (ref 0.7–3.1)
LYMPHOCYTES NFR BLD AUTO: 17.1 % (ref 19.6–45.3)
MAGNESIUM SERPL-MCNC: 2.2 MG/DL (ref 1.6–2.4)
MCH RBC QN AUTO: 30.7 PG (ref 26.6–33)
MCHC RBC AUTO-ENTMCNC: 33.1 G/DL (ref 31.5–35.7)
MCV RBC AUTO: 92.9 FL (ref 79–97)
METHADONE UR QL SCN: NEGATIVE
MONOCYTES # BLD AUTO: 0.44 10*3/MM3 (ref 0.1–0.9)
MONOCYTES NFR BLD AUTO: 6.6 % (ref 5–12)
NEUTROPHILS NFR BLD AUTO: 4.93 10*3/MM3 (ref 1.7–7)
NEUTROPHILS NFR BLD AUTO: 74.1 % (ref 42.7–76)
NITRITE UR QL STRIP: NEGATIVE
NRBC BLD AUTO-RTO: 0 /100 WBC (ref 0–0.2)
OPIATES UR QL: NEGATIVE
OXYCODONE UR QL SCN: NEGATIVE
PH UR STRIP.AUTO: 8.5 [PH] (ref 5–8)
PLATELET # BLD AUTO: 229 10*3/MM3 (ref 140–450)
PMV BLD AUTO: 10.3 FL (ref 6–12)
POTASSIUM SERPL-SCNC: 3.8 MMOL/L (ref 3.5–5.2)
PROT SERPL-MCNC: 6.6 G/DL (ref 6–8.5)
PROT UR QL STRIP: NEGATIVE
QT INTERVAL: 362 MS
QTC INTERVAL: 464 MS
RBC # BLD AUTO: 4.23 10*6/MM3 (ref 3.77–5.28)
RBC # UR STRIP: ABNORMAL /HPF
REF LAB TEST METHOD: ABNORMAL
SALICYLATES SERPL-MCNC: <0.3 MG/DL
SODIUM SERPL-SCNC: 136 MMOL/L (ref 136–145)
SP GR UR STRIP: 1.01 (ref 1–1.03)
SQUAMOUS #/AREA URNS HPF: ABNORMAL /HPF
TROPONIN T SERPL HS-MCNC: 7 NG/L
UROBILINOGEN UR QL STRIP: ABNORMAL
VALPROATE SERPL-MCNC: <2.8 MCG/ML (ref 50–125)
WBC # UR STRIP: ABNORMAL /HPF
WBC NRBC COR # BLD AUTO: 6.65 10*3/MM3 (ref 3.4–10.8)
WHOLE BLOOD HOLD COAG: NORMAL
WHOLE BLOOD HOLD SPECIMEN: NORMAL

## 2024-07-23 PROCEDURE — 80307 DRUG TEST PRSMV CHEM ANLYZR: CPT | Performed by: EMERGENCY MEDICINE

## 2024-07-23 PROCEDURE — 93005 ELECTROCARDIOGRAM TRACING: CPT | Performed by: EMERGENCY MEDICINE

## 2024-07-23 PROCEDURE — 80164 ASSAY DIPROPYLACETIC ACD TOT: CPT | Performed by: EMERGENCY MEDICINE

## 2024-07-23 PROCEDURE — 82077 ASSAY SPEC XCP UR&BREATH IA: CPT | Performed by: EMERGENCY MEDICINE

## 2024-07-23 PROCEDURE — 71045 X-RAY EXAM CHEST 1 VIEW: CPT

## 2024-07-23 PROCEDURE — 80143 DRUG ASSAY ACETAMINOPHEN: CPT | Performed by: EMERGENCY MEDICINE

## 2024-07-23 PROCEDURE — 80179 DRUG ASSAY SALICYLATE: CPT | Performed by: EMERGENCY MEDICINE

## 2024-07-23 PROCEDURE — 99284 EMERGENCY DEPT VISIT MOD MDM: CPT

## 2024-07-23 PROCEDURE — 84484 ASSAY OF TROPONIN QUANT: CPT | Performed by: EMERGENCY MEDICINE

## 2024-07-23 PROCEDURE — 83735 ASSAY OF MAGNESIUM: CPT | Performed by: EMERGENCY MEDICINE

## 2024-07-23 PROCEDURE — 85025 COMPLETE CBC W/AUTO DIFF WBC: CPT | Performed by: EMERGENCY MEDICINE

## 2024-07-23 PROCEDURE — 93010 ELECTROCARDIOGRAM REPORT: CPT | Performed by: INTERNAL MEDICINE

## 2024-07-23 PROCEDURE — 81001 URINALYSIS AUTO W/SCOPE: CPT | Performed by: EMERGENCY MEDICINE

## 2024-07-23 PROCEDURE — 80053 COMPREHEN METABOLIC PANEL: CPT | Performed by: EMERGENCY MEDICINE

## 2024-07-23 RX ORDER — SODIUM CHLORIDE 0.9 % (FLUSH) 0.9 %
10 SYRINGE (ML) INJECTION AS NEEDED
Status: DISCONTINUED | OUTPATIENT
Start: 2024-07-23 | End: 2024-07-23 | Stop reason: HOSPADM

## 2024-07-23 RX ORDER — FLUOXETINE HYDROCHLORIDE 20 MG/1
20 CAPSULE ORAL 2 TIMES DAILY
COMMUNITY

## 2024-07-23 RX ORDER — TOPIRAMATE 100 MG/1
200 TABLET, FILM COATED ORAL 2 TIMES DAILY
COMMUNITY

## 2024-07-23 RX ORDER — ROSUVASTATIN CALCIUM 20 MG/1
20 TABLET, COATED ORAL DAILY
COMMUNITY

## 2024-07-23 RX ORDER — AMITRIPTYLINE HYDROCHLORIDE 25 MG/1
25 TABLET, FILM COATED ORAL NIGHTLY
COMMUNITY

## 2024-07-23 NOTE — ED PROVIDER NOTES
Time: 11:39 AM EDT  Date of encounter:  7/23/2024  Independent Historian/Clinical History and Information was obtained by:   Patient and EMS    History is limited by: N/A    Chief Complaint: Weakness      History of Present Illness:  Patient is a 69 y.o. year old female who presents to the emergency department for evaluation of weakness.  This patient presents to the emergency department from home via EMS.  She called a neighbor and stated that she felt weak and numb all over.  The patient states he has not been sleeping well and she has been somewhat anxious.  The patient is neither suicidal nor homicidal.  She has no other physical complaints.    HPI    Patient Care Team  Primary Care Provider: Lei Coreas MD    Past Medical History:     No Known Allergies  Past Medical History:   Diagnosis Date    GERD (gastroesophageal reflux disease)     Hypertension     Seizures      Past Surgical History:   Procedure Laterality Date    BREAST SURGERY      HYSTERECTOMY       No family history on file.    Home Medications:  Prior to Admission medications    Medication Sig Start Date End Date Taking? Authorizing Provider   amitriptyline (ELAVIL) 25 MG tablet Take 1 tablet by mouth Every Night.    ProviderElida MD   aspirin 81 MG chewable tablet Chew 81 mg Daily.    Elida Donis MD   benztropine (COGENTIN) 1 MG tablet Take 1 mg by mouth 2 (Two) Times a Day.    Elida Donis MD   cetirizine (zyrTEC) 10 MG tablet Take 10 mg by mouth Daily.    Elida Donis MD   divalproex (DEPAKOTE) 500 MG DR tablet Take 500 mg by mouth Every Morning.    Elida Donis MD   divalproex (DEPAKOTE) 500 MG DR tablet Take 1,000 mg by mouth every night at bedtime.    Elida Donis MD   escitalopram (LEXAPRO) 10 MG tablet Take 10 mg by mouth Daily.    Elida Donis MD   FLUoxetine (PROzac) 20 MG capsule Take 1 capsule by mouth 2 (Two) Times a Day.    Elida Donis MD   pantoprazole  "(PROTONIX) 40 MG EC tablet Take 40 mg by mouth Daily.    Elida Donis MD   QUEtiapine (SEROquel) 300 MG tablet Take 600 mg by mouth Every Night.    Elida Donis MD   rosuvastatin (CRESTOR) 20 MG tablet Take 1 tablet by mouth Daily.    Elida Donis MD   topiramate (TOPAMAX) 100 MG tablet Take 2 tablets by mouth 2 (Two) Times a Day.    Elida Donis MD   vitamin D (ERGOCALCIFEROL) 1.25 MG (00371 UT) capsule capsule Take 50,000 Units by mouth 2 (Two) Times a Week.    Elida Donis MD        Social History:   Social History     Tobacco Use    Smoking status: Every Day     Current packs/day: 1.00     Types: Cigarettes   Substance Use Topics    Alcohol use: Not Currently    Drug use: Never         Review of Systems:  Review of Systems   Constitutional:  Negative for chills and fever.   HENT:  Negative for congestion, ear pain and sore throat.    Eyes:  Negative for pain.   Respiratory:  Negative for cough, chest tightness and shortness of breath.    Cardiovascular:  Negative for chest pain.   Gastrointestinal:  Negative for abdominal pain, diarrhea, nausea and vomiting.   Genitourinary:  Negative for flank pain and hematuria.   Musculoskeletal:  Negative for joint swelling.   Skin:  Negative for pallor.   Neurological:  Positive for weakness and numbness. Negative for seizures and headaches.        Generalized numbness and weakness which is bilateral.   All other systems reviewed and are negative.       Physical Exam:  /53   Pulse 87   Temp 98.4 °F (36.9 °C) (Oral)   Resp 17   Ht 157.5 cm (62.01\")   Wt 85.8 kg (189 lb 2.5 oz)   SpO2 94%   BMI 34.59 kg/m²     Physical Exam  Vitals and nursing note reviewed.   Constitutional:       General: She is not in acute distress.     Appearance: Normal appearance. She is not toxic-appearing.   HENT:      Head: Normocephalic and atraumatic.      Mouth/Throat:      Mouth: Mucous membranes are moist.   Eyes:      General: No " scleral icterus.  Cardiovascular:      Rate and Rhythm: Normal rate and regular rhythm.      Pulses: Normal pulses.      Heart sounds: Normal heart sounds.   Pulmonary:      Effort: Pulmonary effort is normal. No respiratory distress.      Breath sounds: Normal breath sounds.   Abdominal:      General: Abdomen is flat.      Palpations: Abdomen is soft.      Tenderness: There is no abdominal tenderness.   Musculoskeletal:         General: Normal range of motion.      Cervical back: Normal range of motion and neck supple.   Skin:     General: Skin is warm and dry.      Capillary Refill: Capillary refill takes less than 2 seconds.   Neurological:      General: No focal deficit present.      Mental Status: She is alert and oriented to person, place, and time. Mental status is at baseline.   Psychiatric:      Comments: The patient has somewhat pressured speech however she is neither suicidal nor homicidal.  She is alert and oriented x 3                  Procedures:  Procedures      Medical Decision Making:      Comorbidities that affect care:    Bipolar disorder    External Notes reviewed:    Previous Clinic Note: Office visit with Dr. Coreas      The following orders were placed and all results were independently analyzed by me:  Orders Placed This Encounter   Procedures    XR Chest 1 View    [place] Draw    Comprehensive Metabolic Panel    Single High Sensitivity Troponin T    Magnesium    Urinalysis With Microscopic If Indicated (No Culture) - Urine, Clean Catch    CBC Auto Differential    Acetaminophen Level    Ethanol    Urine Drug Screen - Urine, Clean Catch    Salicylate Level    Valproic Acid Level, Total    Urinalysis, Microscopic Only - Urine, Clean Catch    NPO Diet NPO Type: Strict NPO    Undress & Gown    Continuous Pulse Oximetry    Vital Signs    Orthostatic Blood Pressure    IP General Consult (Use specialty-specific consult if known)    Oxygen Therapy- Nasal Cannula; Titrate 1-6 LPM Per SpO2; 90 - 95%     POC Glucose Once    ECG 12 Lead ED Triage Standing Order; Weak / Dizzy / AMS    Insert Peripheral IV    Fall Precautions    CBC & Differential    Green Top (Gel)    Lavender Top    Gold Top - SST    Light Blue Top       Medications Given in the Emergency Department:  Medications   sodium chloride 0.9 % flush 10 mL (has no administration in time range)        ED Course:         EKG: Sinus rhythm with rate of 99 bpm  No acute ischemic changes are noted.      Labs:    Lab Results (last 24 hours)       Procedure Component Value Units Date/Time    CBC & Differential [[id number]]  (Abnormal) Collected: 07/23/24 1101    Specimen: Blood Updated: 07/23/24 1114    Narrative:      The following orders were created for panel order CBC & Differential.  Procedure                               Abnormality         Status                     ---------                               -----------         ------                     CBC Auto Differential[[id number]]        Abnormal            Final result                 Please view results for these tests on the individual orders.    Comprehensive Metabolic Panel [[id number]]  (Abnormal) Collected: 07/23/24 1101    Specimen: Blood Updated: 07/23/24 1132     Glucose 146 mg/dL      BUN 9 mg/dL      Creatinine 0.96 mg/dL      Sodium 136 mmol/L      Potassium 3.8 mmol/L      Chloride 100 mmol/L      CO2 24.6 mmol/L      Calcium 9.4 mg/dL      Total Protein 6.6 g/dL      Albumin 4.2 g/dL      ALT (SGPT) 16 U/L      AST (SGOT) 17 U/L      Alkaline Phosphatase 84 U/L      Total Bilirubin 0.4 mg/dL      Globulin 2.4 gm/dL      A/G Ratio 1.8 g/dL      BUN/Creatinine Ratio 9.4     Anion Gap 11.4 mmol/L      eGFR 63.4 mL/min/1.73     Narrative:      GFR Normal >60  Chronic Kidney Disease <60  Kidney Failure <15    The GFR formula is only valid for adults with stable renal function between ages 18 and 70.    Single High Sensitivity Troponin T [[id number]]  (Normal) Collected: 07/23/24 1101    Specimen:  Blood Updated: 07/23/24 1132     HS Troponin T 7 ng/L     Narrative:      High Sensitive Troponin T Reference Range:  <14.0 ng/L- Negative Female for AMI  <22.0 ng/L- Negative Male for AMI  >=14 - Abnormal Female indicating possible myocardial injury.  >=22 - Abnormal Male indicating possible myocardial injury.   Clinicians would have to utilize clinical acumen, EKG, Troponin, and serial changes to determine if it is an Acute Myocardial Infarction or myocardial injury due to an underlying chronic condition.         Magnesium [[id number]]  (Normal) Collected: 07/23/24 1101    Specimen: Blood Updated: 07/23/24 1132     Magnesium 2.2 mg/dL     CBC Auto Differential [[id number]]  (Abnormal) Collected: 07/23/24 1101    Specimen: Blood Updated: 07/23/24 1114     WBC 6.65 10*3/mm3      RBC 4.23 10*6/mm3      Hemoglobin 13.0 g/dL      Hematocrit 39.3 %      MCV 92.9 fL      MCH 30.7 pg      MCHC 33.1 g/dL      RDW 12.9 %      RDW-SD 43.8 fl      MPV 10.3 fL      Platelets 229 10*3/mm3      Neutrophil % 74.1 %      Lymphocyte % 17.1 %      Monocyte % 6.6 %      Eosinophil % 1.2 %      Basophil % 0.8 %      Immature Grans % 0.2 %      Neutrophils, Absolute 4.93 10*3/mm3      Lymphocytes, Absolute 1.14 10*3/mm3      Monocytes, Absolute 0.44 10*3/mm3      Eosinophils, Absolute 0.08 10*3/mm3      Basophils, Absolute 0.05 10*3/mm3      Immature Grans, Absolute 0.01 10*3/mm3      nRBC 0.0 /100 WBC     Acetaminophen Level [[id number]]  (Normal) Collected: 07/23/24 1101    Specimen: Blood Updated: 07/23/24 1158     Acetaminophen <5.0 mcg/mL     Ethanol [[id number]] Collected: 07/23/24 1101    Specimen: Blood Updated: 07/23/24 1158     Ethanol <10 mg/dL      Ethanol % <0.010 %     Narrative:      Ethanol (Plasma)  <10 Essentially Negative    Toxic Concentrations           mg/dL    Flushing, slowing of reflexes    Impaired visual activity         Depression of CNS              >100  Possible Coma                  >300        Salicylate Level [[id number]]  (Normal) Collected: 07/23/24 1101    Specimen: Blood Updated: 07/23/24 1158     Salicylate <0.3 mg/dL     Valproic Acid Level, Total [[id number]]  (Abnormal) Collected: 07/23/24 1101    Specimen: Blood Updated: 07/23/24 1216     Valproic Acid <2.8 mcg/mL     Narrative:      Therapeutic Ranges for Valproic Acid    Epilepsy:       mcg/ml  Bipolar/Nadiya  up to 125 mcg/ml      Urinalysis With Microscopic If Indicated (No Culture) - Urine, Clean Catch [[id number]]  (Abnormal) Collected: 07/23/24 1231    Specimen: Urine, Clean Catch Updated: 07/23/24 1300     Color, UA Yellow     Appearance, UA Clear     pH, UA 8.5     Specific Gravity, UA 1.007     Glucose, UA Negative     Ketones, UA Negative     Bilirubin, UA Negative     Blood, UA Negative     Protein, UA Negative     Leuk Esterase, UA Small (1+)     Nitrite, UA Negative     Urobilinogen, UA 0.2 E.U./dL    Urine Drug Screen - Urine, Clean Catch [[id number]]  (Abnormal) Collected: 07/23/24 1231    Specimen: Urine, Clean Catch Updated: 07/23/24 1305     Amphet/Methamphet, Screen Negative     Barbiturates Screen, Urine Negative     Benzodiazepine Screen, Urine Positive     Cocaine Screen, Urine Negative     Opiate Screen Negative     THC, Screen, Urine Negative     Methadone Screen, Urine Negative     Oxycodone Screen, Urine Negative     Fentanyl, Urine Negative    Narrative:      Negative Thresholds Per Drugs Screened:    Amphetamines                 500 ng/ml  Barbiturates                 200 ng/ml  Benzodiazepines              100 ng/ml  Cocaine                      300 ng/ml  Methadone                    300 ng/ml  Opiates                      300 ng/ml  Oxycodone                    100 ng/ml  THC                           50 ng/ml  Fentanyl                       5 ng/ml      The Normal Value for all drugs tested is negative. This report includes final unconfirmed screening results to be used for medical treatment purposes only.  Unconfirmed results must not be used for non-medical purposes such as employment or legal testing. Clinical consideration should be applied to any drug of abuse test, particularly when unconfirmed results are used.            Urinalysis, Microscopic Only - Urine, Clean Catch [[id number]]  (Abnormal) Collected: 07/23/24 1231    Specimen: Urine, Clean Catch Updated: 07/23/24 1300     RBC, UA 0-2 /HPF      WBC, UA 3-5 /HPF      Bacteria, UA Trace /HPF      Squamous Epithelial Cells, UA 3-6 /HPF      Hyaline Casts, UA 0-2 /LPF      Methodology Automated Microscopy             Imaging:    XR Chest 1 View    Result Date: 7/23/2024  XR CHEST 1 VW Date of Exam: 7/23/2024 10:58 AM EDT Indication: Weak/Dizzy/AMS triage protocol Comparison: 1/26/2024 Findings: There is patchy airspace opacity in the left base which is mostly secured by overlying heart border. This may be due to atelectasis or pneumonia. No pneumothorax or large effusion identified. Pulmonary vascularity appears within normal limits. Heart size  and mediastinal contour are within normal limits. Aortic vascular calcifications noted.     Impression: Left basilar opacity may be due to atelectasis or pneumonia. Correlate clinically. Electronically Signed: Wally Valdivia MD  7/23/2024 11:01 AM EDT  Workstation ID: [id number]       Differential Diagnosis and Discussion:    Weakness: Based on the patient's history, signs, and symptoms, the diffential diagnosis includes but is not limited to meningitis, stroke, sepsis, subarachnoid hemorrhage, intracranial bleeding, encephalitis, acute uti, dehydration, MS, myasthenia gravis, Guillan [place], migraine variant, neuromuscular disorders vertigo, electrolyte imbalance, and metabolic disorders.    All labs were reviewed and interpreted by me.  All X-rays impressions were independently interpreted by me.  EKG was interpreted by me.    MDM     Amount and/or Complexity of Data Reviewed  Clinical lab tests: reviewed  Tests in the  radiology section of CPT®: reviewed  Tests in the medicine section of CPT®: reviewed                 Patient Care Considerations:    CT HEAD: I considered ordering a noncontrast CT of the head, however the patient has no focal neurologic deficits and complains of generalized weakness and occasional bilateral foot numbness.      Consultants/Shared Management Plan:    Consultant: I have discussed the case with Dr. Coreas who states the patient may need psychiatric services.    Social Determinants of Health:    Patient is independent, reliable, and has access to care.       Disposition and Care Coordination:    Discharged: I considered escalation of care by admitting this patient to the hospital, however after consultation with  in the emergency department it was felt that the patient is not in need of hospitalization at this time as she exhibits no signs of acute psychosis.  She will also is neither suicidal nor homicidal.  An appointment was made with our  for [facility] and the patient will be provided with transportation home today as well as to and from [facility].    I have explained discharge medications and the need for follow up with the patient/caretakers. This was also printed in the discharge instructions. Patient was discharged with the following medications and follow up:      Medication List      No changes were made to your prescriptions during this visit.      Lei Coreas MD  [address]  [facility] [address]  [phone]    In 1 day      [facility]  [address]  [facility] [address]  [phone]    As scheduled       Final diagnoses:   Weakness   Bipolar affective disorder, remission status unspecified        ED Disposition       ED Disposition   Discharge    Condition   Stable    Comment   --               This medical record created using voice recognition software.             Benigno Gonsalves,   07/23/24 1503

## 2024-07-23 NOTE — SIGNIFICANT NOTE
07/23/24 5962   Plan   Plan Comments SW met with patient who reports that she is on disability but would like assistance in the home with light housework. Pt states that she has about 3 cans of food left in her home. When SW discussed getting her some food at d/c, she reports that she is a vegetarian and limited on what she can eat. Pt states that she has attempted to go to the disasbility office but it costs $6 for a ride there. Pt reports that she wanted to get established with outpatient mental health services again. SHAI was able to contact [facility] and schedule for patient at the [facility] for 7/29/2024 at 12:30. SHAI also scheduled tranpsortation with [facility] to pick patient up and transport her to appointment scheduled and back home. SHAI then was able to schedule for patient to be picked up by [facility] to be taken back home from the ED. SW requested that patient be set up with Case management services at [facility] as well to assist her with setting up any additional services she may need.   Final Discharge Disposition Code 01 - home or self-care

## 2024-08-03 ENCOUNTER — HOSPITAL ENCOUNTER (EMERGENCY)
Facility: HOSPITAL | Age: 69
Discharge: ANOTHER HOSPITAL | End: 2024-08-04
Attending: EMERGENCY MEDICINE | Admitting: EMERGENCY MEDICINE
Payer: MEDICARE

## 2024-08-03 DIAGNOSIS — F31.2 BIPOLAR AFFECTIVE DISORDER, CURRENTLY MANIC, SEVERE, WITH PSYCHOTIC FEATURES: Primary | ICD-10-CM

## 2024-08-03 LAB
ALBUMIN SERPL-MCNC: 4.2 G/DL (ref 3.5–5.2)
ALBUMIN/GLOB SERPL: 1.6 G/DL
ALP SERPL-CCNC: 80 U/L (ref 39–117)
ALT SERPL W P-5'-P-CCNC: 19 U/L (ref 1–33)
ANION GAP SERPL CALCULATED.3IONS-SCNC: 16.1 MMOL/L (ref 5–15)
APAP SERPL-MCNC: <5 MCG/ML (ref 0–30)
AST SERPL-CCNC: 25 U/L (ref 1–32)
BASOPHILS # BLD AUTO: 0.07 10*3/MM3 (ref 0–0.2)
BASOPHILS NFR BLD AUTO: 0.8 % (ref 0–1.5)
BILIRUB SERPL-MCNC: 0.3 MG/DL (ref 0–1.2)
BUN SERPL-MCNC: 16 MG/DL (ref 8–23)
BUN/CREAT SERPL: 14.2 (ref 7–25)
CALCIUM SPEC-SCNC: 8.9 MG/DL (ref 8.6–10.5)
CHLORIDE SERPL-SCNC: 100 MMOL/L (ref 98–107)
CO2 SERPL-SCNC: 18.9 MMOL/L (ref 22–29)
CREAT SERPL-MCNC: 1.13 MG/DL (ref 0.57–1)
DEPRECATED RDW RBC AUTO: 43.1 FL (ref 37–54)
EGFRCR SERPLBLD CKD-EPI 2021: 52.1 ML/MIN/1.73
EOSINOPHIL # BLD AUTO: 0.09 10*3/MM3 (ref 0–0.4)
EOSINOPHIL NFR BLD AUTO: 1 % (ref 0.3–6.2)
ERYTHROCYTE [DISTWIDTH] IN BLOOD BY AUTOMATED COUNT: 13 % (ref 12.3–15.4)
ETHANOL BLD-MCNC: <10 MG/DL (ref 0–10)
ETHANOL UR QL: <0.01 %
GLOBULIN UR ELPH-MCNC: 2.6 GM/DL
GLUCOSE SERPL-MCNC: 106 MG/DL (ref 65–99)
HCT VFR BLD AUTO: 35.9 % (ref 34–46.6)
HGB BLD-MCNC: 12 G/DL (ref 12–15.9)
HOLD SPECIMEN: NORMAL
HOLD SPECIMEN: NORMAL
IMM GRANULOCYTES # BLD AUTO: 0.02 10*3/MM3 (ref 0–0.05)
IMM GRANULOCYTES NFR BLD AUTO: 0.2 % (ref 0–0.5)
LYMPHOCYTES # BLD AUTO: 2.2 10*3/MM3 (ref 0.7–3.1)
LYMPHOCYTES NFR BLD AUTO: 24.3 % (ref 19.6–45.3)
MCH RBC QN AUTO: 30.5 PG (ref 26.6–33)
MCHC RBC AUTO-ENTMCNC: 33.4 G/DL (ref 31.5–35.7)
MCV RBC AUTO: 91.1 FL (ref 79–97)
MONOCYTES # BLD AUTO: 0.75 10*3/MM3 (ref 0.1–0.9)
MONOCYTES NFR BLD AUTO: 8.3 % (ref 5–12)
NEUTROPHILS NFR BLD AUTO: 5.94 10*3/MM3 (ref 1.7–7)
NEUTROPHILS NFR BLD AUTO: 65.4 % (ref 42.7–76)
NRBC BLD AUTO-RTO: 0 /100 WBC (ref 0–0.2)
PLATELET # BLD AUTO: 268 10*3/MM3 (ref 140–450)
PMV BLD AUTO: 9.8 FL (ref 6–12)
POTASSIUM SERPL-SCNC: 3.2 MMOL/L (ref 3.5–5.2)
PROT SERPL-MCNC: 6.8 G/DL (ref 6–8.5)
RBC # BLD AUTO: 3.94 10*6/MM3 (ref 3.77–5.28)
SALICYLATES SERPL-MCNC: <0.3 MG/DL
SODIUM SERPL-SCNC: 135 MMOL/L (ref 136–145)
VALPROATE SERPL-MCNC: <2.8 MCG/ML (ref 50–125)
WBC NRBC COR # BLD AUTO: 9.07 10*3/MM3 (ref 3.4–10.8)
WHOLE BLOOD HOLD COAG: NORMAL
WHOLE BLOOD HOLD SPECIMEN: NORMAL

## 2024-08-03 PROCEDURE — 80179 DRUG ASSAY SALICYLATE: CPT | Performed by: EMERGENCY MEDICINE

## 2024-08-03 PROCEDURE — 83735 ASSAY OF MAGNESIUM: CPT | Performed by: EMERGENCY MEDICINE

## 2024-08-03 PROCEDURE — 85025 COMPLETE CBC W/AUTO DIFF WBC: CPT | Performed by: EMERGENCY MEDICINE

## 2024-08-03 PROCEDURE — 25010000002 ZIPRASIDONE MESYLATE PER 10 MG: Performed by: EMERGENCY MEDICINE

## 2024-08-03 PROCEDURE — 82607 VITAMIN B-12: CPT | Performed by: EMERGENCY MEDICINE

## 2024-08-03 PROCEDURE — 80143 DRUG ASSAY ACETAMINOPHEN: CPT | Performed by: EMERGENCY MEDICINE

## 2024-08-03 PROCEDURE — 80164 ASSAY DIPROPYLACETIC ACD TOT: CPT | Performed by: EMERGENCY MEDICINE

## 2024-08-03 PROCEDURE — 99285 EMERGENCY DEPT VISIT HI MDM: CPT

## 2024-08-03 PROCEDURE — 84443 ASSAY THYROID STIM HORMONE: CPT | Performed by: EMERGENCY MEDICINE

## 2024-08-03 PROCEDURE — 82077 ASSAY SPEC XCP UR&BREATH IA: CPT | Performed by: EMERGENCY MEDICINE

## 2024-08-03 PROCEDURE — 80053 COMPREHEN METABOLIC PANEL: CPT | Performed by: EMERGENCY MEDICINE

## 2024-08-03 PROCEDURE — 36415 COLL VENOUS BLD VENIPUNCTURE: CPT

## 2024-08-03 PROCEDURE — 93005 ELECTROCARDIOGRAM TRACING: CPT | Performed by: EMERGENCY MEDICINE

## 2024-08-03 PROCEDURE — 96372 THER/PROPH/DIAG INJ SC/IM: CPT

## 2024-08-03 RX ORDER — SODIUM CHLORIDE 0.9 % (FLUSH) 0.9 %
10 SYRINGE (ML) INJECTION AS NEEDED
Status: DISCONTINUED | OUTPATIENT
Start: 2024-08-03 | End: 2024-08-04 | Stop reason: HOSPADM

## 2024-08-03 RX ADMIN — ZIPRASIDONE MESYLATE 20 MG: 20 INJECTION, POWDER, LYOPHILIZED, FOR SOLUTION INTRAMUSCULAR at 23:40

## 2024-08-04 ENCOUNTER — APPOINTMENT (OUTPATIENT)
Dept: GENERAL RADIOLOGY | Facility: HOSPITAL | Age: 69
End: 2024-08-04
Payer: MEDICARE

## 2024-08-04 VITALS
TEMPERATURE: 98.6 F | HEIGHT: 62 IN | SYSTOLIC BLOOD PRESSURE: 158 MMHG | HEART RATE: 84 BPM | WEIGHT: 172.84 LBS | BODY MASS INDEX: 31.81 KG/M2 | RESPIRATION RATE: 18 BRPM | OXYGEN SATURATION: 96 % | DIASTOLIC BLOOD PRESSURE: 76 MMHG

## 2024-08-04 LAB
AMPHET+METHAMPHET UR QL: NEGATIVE
BACTERIA UR QL AUTO: ABNORMAL /HPF
BARBITURATES UR QL SCN: NEGATIVE
BENZODIAZ UR QL SCN: POSITIVE
BILIRUB UR QL STRIP: NEGATIVE
CANNABINOIDS SERPL QL: NEGATIVE
CLARITY UR: CLEAR
COCAINE UR QL: NEGATIVE
COLOR UR: YELLOW
FENTANYL UR-MCNC: NEGATIVE NG/ML
FLUAV SUBTYP SPEC NAA+PROBE: NOT DETECTED
FLUBV RNA ISLT QL NAA+PROBE: NOT DETECTED
GLUCOSE UR STRIP-MCNC: NEGATIVE MG/DL
HGB UR QL STRIP.AUTO: NEGATIVE
HYALINE CASTS UR QL AUTO: ABNORMAL /LPF
KETONES UR QL STRIP: ABNORMAL
LEUKOCYTE ESTERASE UR QL STRIP.AUTO: ABNORMAL
MAGNESIUM SERPL-MCNC: 1.9 MG/DL (ref 1.6–2.4)
METHADONE UR QL SCN: POSITIVE
NITRITE UR QL STRIP: NEGATIVE
OPIATES UR QL: NEGATIVE
OXYCODONE UR QL SCN: NEGATIVE
PH UR STRIP.AUTO: 6 [PH] (ref 5–8)
PROT UR QL STRIP: NEGATIVE
QT INTERVAL: 383 MS
QTC INTERVAL: 467 MS
RBC # UR STRIP: ABNORMAL /HPF
REF LAB TEST METHOD: ABNORMAL
RSV RNA NPH QL NAA+NON-PROBE: NOT DETECTED
SARS-COV-2 RNA RESP QL NAA+PROBE: NOT DETECTED
SP GR UR STRIP: 1.01 (ref 1–1.03)
SQUAMOUS #/AREA URNS HPF: ABNORMAL /HPF
TSH SERPL DL<=0.05 MIU/L-ACNC: 1.21 UIU/ML (ref 0.27–4.2)
UROBILINOGEN UR QL STRIP: ABNORMAL
VIT B12 BLD-MCNC: 320 PG/ML (ref 211–946)
WBC # UR STRIP: ABNORMAL /HPF

## 2024-08-04 PROCEDURE — 87637 SARSCOV2&INF A&B&RSV AMP PRB: CPT | Performed by: EMERGENCY MEDICINE

## 2024-08-04 PROCEDURE — 71045 X-RAY EXAM CHEST 1 VIEW: CPT

## 2024-08-04 PROCEDURE — 80307 DRUG TEST PRSMV CHEM ANLYZR: CPT | Performed by: EMERGENCY MEDICINE

## 2024-08-04 PROCEDURE — 81001 URINALYSIS AUTO W/SCOPE: CPT | Performed by: EMERGENCY MEDICINE

## 2024-08-04 PROCEDURE — 25010000002 ZIPRASIDONE MESYLATE PER 10 MG: Performed by: EMERGENCY MEDICINE

## 2024-08-04 PROCEDURE — 96372 THER/PROPH/DIAG INJ SC/IM: CPT

## 2024-08-04 RX ORDER — POTASSIUM CHLORIDE 750 MG/1
40 CAPSULE, EXTENDED RELEASE ORAL ONCE
Status: COMPLETED | OUTPATIENT
Start: 2024-08-04 | End: 2024-08-04

## 2024-08-04 RX ADMIN — POTASSIUM CHLORIDE 40 MEQ: 750 CAPSULE, EXTENDED RELEASE ORAL at 01:04

## 2024-08-04 RX ADMIN — ZIPRASIDONE MESYLATE 20 MG: 20 INJECTION, POWDER, LYOPHILIZED, FOR SOLUTION INTRAMUSCULAR at 07:56

## 2024-08-04 NOTE — ED PROVIDER NOTES
"Time: 10:29 PM EDT  Date of encounter:  8/3/2024  Independent Historian/Clinical History and Information was obtained by:   Patient    History is limited by: Altered Mental Status    Chief Complaint: Altered mental status      History of Present Illness:  Patient is a 69 y.o. year old female who presents to the emergency department for evaluation of altered mental status.  Reportedly the police were involved today and called EMS for medical transport.  Patient has been off of her psychiatric medications recently.  Daughter at bedside states the patient's has possibly be admitted using illicit drugs and smoking some \"good shit\" per the patient report to her.  Patient denies this.  Family produces a very long text message with psychotic thoughts and they state the patient is definitely hallucinating.      HPI    Patient Care Team  Primary Care Provider: Lei Coreas MD    Past Medical History:     No Known Allergies  Past Medical History:   Diagnosis Date    GERD (gastroesophageal reflux disease)     Hypertension     Seizures      Past Surgical History:   Procedure Laterality Date    BREAST SURGERY      HYSTERECTOMY       History reviewed. No pertinent family history.    Home Medications:  Prior to Admission medications    Medication Sig Start Date End Date Taking? Authorizing Provider   amitriptyline (ELAVIL) 25 MG tablet Take 1 tablet by mouth Every Night.    ProviderElida MD   aspirin 81 MG chewable tablet Chew 81 mg Daily.    Elida Donis MD   benztropine (COGENTIN) 1 MG tablet Take 1 mg by mouth 2 (Two) Times a Day.    Elida Donis MD   cetirizine (zyrTEC) 10 MG tablet Take 10 mg by mouth Daily.    Elida Donis MD   divalproex (DEPAKOTE) 500 MG DR tablet Take 500 mg by mouth Every Morning.    Elida Donis MD   divalproex (DEPAKOTE) 500 MG DR tablet Take 1,000 mg by mouth every night at bedtime.    Elida Donis MD   escitalopram (LEXAPRO) 10 MG tablet Take 10 " "mg by mouth Daily.    Elida Donis MD   FLUoxetine (PROzac) 20 MG capsule Take 1 capsule by mouth 2 (Two) Times a Day.    Elida Donis MD   pantoprazole (PROTONIX) 40 MG EC tablet Take 40 mg by mouth Daily.    Elida Donis MD   QUEtiapine (SEROquel) 300 MG tablet Take 600 mg by mouth Every Night.    Elida Donis MD   rosuvastatin (CRESTOR) 20 MG tablet Take 1 tablet by mouth Daily.    Elida Donis MD   topiramate (TOPAMAX) 100 MG tablet Take 2 tablets by mouth 2 (Two) Times a Day.    Elida Donis MD   vitamin D (ERGOCALCIFEROL) 1.25 MG (78013 UT) capsule capsule Take 50,000 Units by mouth 2 (Two) Times a Week.    Elida Donis MD        Social History:   Social History     Tobacco Use    Smoking status: Every Day     Current packs/day: 1.00     Types: Cigarettes   Substance Use Topics    Alcohol use: Not Currently    Drug use: Never         Review of Systems:  Review of Systems   Unable to perform ROS: Psychiatric disorder        Physical Exam:  /76   Pulse 84   Temp 98.6 °F (37 °C)   Resp 18   Ht 157.5 cm (62.01\")   Wt 78.4 kg (172 lb 13.5 oz)   SpO2 96%   BMI 31.60 kg/m²     Physical Exam  Vitals and nursing note reviewed.   Constitutional:       Appearance: She is not toxic-appearing.   HENT:      Head: Normocephalic and atraumatic.      Nose: Nose normal.      Mouth/Throat:      Mouth: Mucous membranes are dry.   Eyes:      Extraocular Movements: Extraocular movements intact.      Pupils: Pupils are equal, round, and reactive to light.   Cardiovascular:      Rate and Rhythm: Normal rate and regular rhythm.   Pulmonary:      Effort: Pulmonary effort is normal.      Breath sounds: Normal breath sounds.   Abdominal:      General: Bowel sounds are normal.      Palpations: Abdomen is soft.      Tenderness: There is no abdominal tenderness.   Musculoskeletal:         General: No swelling. Normal range of motion.      Cervical back: Normal range " of motion and neck supple.   Skin:     General: Skin is warm and dry.      Coloration: Skin is not jaundiced.   Neurological:      General: No focal deficit present.      Mental Status: She is alert.      Cranial Nerves: No cranial nerve deficit.      Motor: No weakness.   Psychiatric:      Comments: Very aggressive.  Screaming and flailing arms at me while I attempted to perform a physical exam.  Patient has tangential thoughts and flight of ideas with active delusions.                  Procedures:  Procedures      Medical Decision Making:      Comorbidities that affect care:    Bipolar, seizure disorder, GERD, hypertension    External Notes reviewed:    Previous Clinic Note: PMD office visit 7/22/2024 with Dr. Coreas.  Diagnosis: Bipolar affective disorder, current episode hypomanic      The following orders were placed and all results were independently analyzed by me:  Orders Placed This Encounter   Procedures    COVID-19, FLU A/B, RSV PCR 1 HR TAT - Swab, Nasopharynx    XR Chest 1 View    [place] Draw    Comprehensive Metabolic Panel    Acetaminophen Level    Ethanol    Salicylate Level    Urine Drug Screen - Urine, Clean Catch    Valproic Acid Level, Total    CBC Auto Differential    Urinalysis With Culture If Indicated - Urine, Clean Catch    Vitamin B12    Urinalysis, Microscopic Only - Urine, Clean Catch    TSH    Magnesium    Continuous Pulse Oximetry    Vital Signs    ECG 12 Lead Altered Mental Status    CBC & Differential    Green Top (Gel)    Lavender Top    Gold Top - SST    Light Blue Top       Medications Given in the Emergency Department:  Medications   ziprasidone (GEODON) 20 mg in sterile water (preservative free) 1 mL injection (20 mg Intramuscular Given 8/3/24 2340)   potassium chloride (MICRO-K/KLOR-CON) CR capsule (40 mEq Oral Given 8/4/24 0104)   ziprasidone (GEODON) 20 mg in sterile water (preservative free) 1 mL injection (20 mg Intramuscular Given 8/4/24 0756)        ED Course:    ED  Course as of 08/06/24 1845   Sat Aug 03, 2024   2231 I have personally interpreted the EKG today and it shows no evidence of any acute ischemia or heart arrhythmia.  [RP]      ED Course User Index  [RP] Bhanu Amaral MD       Labs:    Lab Results (last 24 hours)       ** No results found for the last 24 hours. **             Imaging:    No Radiology Exams Resulted Within Past 24 Hours      Differential Diagnosis and Discussion:    Altered Mental Status: Based on the patient's signs and symptoms, differential diagnosis includes but is not limited to meningitis, stroke, sepsis, subarachnoid hemorrhage, intracranial bleeding, encephalitis, and metabolic encephalopathy.    All labs were reviewed and interpreted by me.  EKG was interpreted by me.    MDM               Patient Care Considerations:    MRI: I considered ordering an MRI however patient has no focal neurologic deficits by history or physical exam      Consultants/Shared Management Plan:        Social Determinants of Health:    Patient is unable to carry out activities of daily life. Escalation of care is necessary.       Disposition and Care Coordination:    Transferred: Through independent evaluation of the patient's history, physical, and imperical data, the patient meets criteria to be transferred to another hospital for evaluation/admission.        Final diagnoses:   Bipolar affective disorder, currently manic, severe, with psychotic features        ED Disposition       ED Disposition   Transfer to Another Facility     Condition   --    Comment   --               This medical record created using voice recognition software.             Bhanu Amaral MD  08/06/24 1849

## 2024-08-04 NOTE — ED NOTES
[facility] vitamin B12 level, magnesium, and TSH results faxed over before they will decide on admission status

## 2024-08-20 ENCOUNTER — HOSPITAL ENCOUNTER (EMERGENCY)
Facility: HOSPITAL | Age: 69
Discharge: HOME | End: 2024-08-21
Attending: EMERGENCY MEDICINE
Payer: MEDICARE

## 2024-08-20 VITALS
HEIGHT: 62 IN | WEIGHT: 188.05 LBS | DIASTOLIC BLOOD PRESSURE: 62 MMHG | SYSTOLIC BLOOD PRESSURE: 144 MMHG | RESPIRATION RATE: 16 BRPM | HEART RATE: 72 BPM | BODY MASS INDEX: 34.61 KG/M2 | OXYGEN SATURATION: 92 % | TEMPERATURE: 99 F

## 2024-08-20 DIAGNOSIS — F20.9 SCHIZOPHRENIA, UNSPECIFIED TYPE: Primary | ICD-10-CM

## 2024-08-20 PROCEDURE — 99283 EMERGENCY DEPT VISIT LOW MDM: CPT

## 2024-08-21 NOTE — ED PROVIDER NOTES
Time: 10:52 PM EDT  Date of encounter:  8/20/2024  Independent Historian/Clinical History and Information was obtained by:   Patient, daughter    History is limited by: N/A    Chief Complaint: Nadiya      History of Present Illness:  Patient is a 69 y.o. year old female who presents to the emergency department for evaluation of possible nadiya.  The patient has no suicidal or homicidal ideation.  The patient reports that she recently restarted her psychiatric medications.  Patient denies chest pain or shortness of breath.  Patient has no cough or hemoptysis.  Patient denies dysuria and urinary frequency.    HPI    Patient Care Team  Primary Care Provider: Lei Coreas MD    Past Medical History:     No Known Allergies  Past Medical History:   Diagnosis Date    GERD (gastroesophageal reflux disease)     Hypertension     Seizures      Past Surgical History:   Procedure Laterality Date    BREAST SURGERY      HYSTERECTOMY       History reviewed. No pertinent family history.    Home Medications:  Prior to Admission medications    Medication Sig Start Date End Date Taking? Authorizing Provider   amitriptyline (ELAVIL) 25 MG tablet Take 1 tablet by mouth Every Night.    ProviderElida MD   aspirin 81 MG chewable tablet Chew 81 mg Daily.    Elida Donis MD   benztropine (COGENTIN) 1 MG tablet Take 1 mg by mouth 2 (Two) Times a Day.    Elida Donis MD   cetirizine (zyrTEC) 10 MG tablet Take 10 mg by mouth Daily.    Elida Donis MD   divalproex (DEPAKOTE) 500 MG DR tablet Take 500 mg by mouth Every Morning.    Elida Donis MD   divalproex (DEPAKOTE) 500 MG DR tablet Take 1,000 mg by mouth every night at bedtime.    Elida Donis MD   escitalopram (LEXAPRO) 10 MG tablet Take 10 mg by mouth Daily.    Elida Donis MD   FLUoxetine (PROzac) 20 MG capsule Take 1 capsule by mouth 2 (Two) Times a Day.    Elida Donis MD   pantoprazole (PROTONIX) 40 MG EC tablet  "Take 40 mg by mouth Daily.    Elida Donis MD   QUEtiapine (SEROquel) 300 MG tablet Take 600 mg by mouth Every Night.    Elida Donis MD   rosuvastatin (CRESTOR) 20 MG tablet Take 1 tablet by mouth Daily.    Elida Donis MD   topiramate (TOPAMAX) 100 MG tablet Take 2 tablets by mouth 2 (Two) Times a Day.    Elida Donis MD   vitamin D (ERGOCALCIFEROL) 1.25 MG (15864 UT) capsule capsule Take 50,000 Units by mouth 2 (Two) Times a Week.    Elida Donis MD        Social History:   Social History     Tobacco Use    Smoking status: Every Day     Current packs/day: 0.50     Types: Cigarettes   Substance Use Topics    Alcohol use: Not Currently    Drug use: Never         Review of Systems:  Review of Systems   Constitutional:  Negative for chills and fever.   HENT:  Negative for congestion, rhinorrhea and sore throat.    Eyes:  Negative for pain and visual disturbance.   Respiratory:  Negative for apnea, cough, chest tightness and shortness of breath.    Cardiovascular:  Negative for chest pain and palpitations.   Gastrointestinal:  Negative for abdominal pain, diarrhea, nausea and vomiting.   Genitourinary:  Negative for difficulty urinating and dysuria.   Musculoskeletal:  Negative for joint swelling and myalgias.   Skin:  Negative for color change.   Neurological:  Negative for seizures and headaches.   Psychiatric/Behavioral: Negative.     All other systems reviewed and are negative.       Physical Exam:  /83   Pulse 74   Temp 99 °F (37.2 °C) (Oral)   Resp 16   Ht 157.5 cm (62\")   Wt 85.3 kg (188 lb 0.8 oz)   SpO2 96%   BMI 34.40 kg/m²     Physical Exam  Vitals and nursing note reviewed.   Constitutional:       General: She is not in acute distress.     Appearance: Normal appearance. She is not toxic-appearing.   HENT:      Head: Normocephalic and atraumatic.      Jaw: There is normal jaw occlusion.   Eyes:      General: Lids are normal.      Extraocular " Movements: Extraocular movements intact.      Conjunctiva/sclera: Conjunctivae normal.      Pupils: Pupils are equal, round, and reactive to light.   Cardiovascular:      Rate and Rhythm: Normal rate and regular rhythm.      Pulses: Normal pulses.      Heart sounds: Normal heart sounds.   Pulmonary:      Effort: Pulmonary effort is normal. No respiratory distress.      Breath sounds: Normal breath sounds. No wheezing or rhonchi.   Abdominal:      General: Abdomen is flat.      Palpations: Abdomen is soft.      Tenderness: There is no abdominal tenderness. There is no guarding or rebound.   Musculoskeletal:         General: Normal range of motion.      Cervical back: Normal range of motion and neck supple.      Right lower leg: No edema.      Left lower leg: No edema.   Skin:     General: Skin is warm and dry.   Neurological:      Mental Status: She is alert and oriented to person, place, and time. Mental status is at baseline.   Psychiatric:         Mood and Affect: Mood normal.                  Procedures:  Procedures      Medical Decision Making:      Comorbidities that affect care:    Schizophrenia    External Notes reviewed:    Previous ED Note: Patient was recently transferred to [facility].      The following orders were placed and all results were independently analyzed by me:  No orders of the defined types were placed in this encounter.      Medications Given in the Emergency Department:  Medications - No data to display     ED Course:         Labs:    Lab Results (last 24 hours)       ** No results found for the last 24 hours. **             Imaging:    No Radiology Exams Resulted Within Past 24 Hours      Differential Diagnosis and Discussion:    Psychiatric: Differential diagnosis includes but is not limited to depression, psychosis, bipolar disorder, anxiety, manic episode, schizophrenia, and substance abuse.        MDM     The patient is resting comfortably and has been evaluated by myself and social  services in the emergency department. The patient is cooperative, alert, talkative, interactive and in no distress. The patient's history, exam, diagnostic testing and current condition do not suggest an acute medical condition or other significant pathology that would warrant further testing, continued ED treatment, admission, neurological consultation, or other specialist evaluation. Based on my personal examination and observation, the patient is not acutely suicidal or homicidal and does not meet criteria for involuntary commitment. The patient is not a danger to self or others at this time. The patient at this point seems reliable and has the insight and judgment necessary to be discharged from mental health or other appropriate follow-up. The vital signs have been stable. The patient's condition is stable and appropriate for discharge. The patient will pursue further outpatient evaluation and care as indicated in the discharge instructions.          Patient Care Considerations:    PSYCH: I considered ordering anxiolytic and or antipsychotic medications, however patient was able to facilitate the medical screening exam and disposition without further medications.      Consultants/Shared Management Plan:    Case was discussed with  who evaluated the patient.  The patient is stable and suitable for discharge and is able to get close follow-up.    Social Determinants of Health:    Patient has presented with family members who are responsible, reliable and will ensure follow up care.      Disposition and Care Coordination:    Discharged: I considered escalation of care by admitting this patient to the hospital, however patient is requesting discharge home.    I have explained the patient´s condition, diagnoses and treatment plan based on the information available to me at this time. I have answered questions and addressed any concerns. The patient has a good  understanding of the patient´s diagnosis,  condition, and treatment plan as can be expected at this point. The vital signs have been stable. The patient´s condition is stable and appropriate for discharge from the emergency department.      The patient will pursue further outpatient evaluation with the primary care physician or other designated or consulting physician as outlined in the discharge instructions. They are agreeable to this plan of care and follow-up instructions have been explained in detail. The patient has received these instructions in written format and has expressed an understanding of the discharge instructions. The patient is aware that any significant change in condition or worsening of symptoms should prompt an immediate return to this or the closest emergency department or call to 911.  I have explained discharge medications and the need for follow up with the patient/caretakers. This was also printed in the discharge instructions. Patient was discharged with the following medications and follow up:      Medication List      No changes were made to your prescriptions during this visit.      Lei Coreas MD  [address] [facility]  [place] [address]  [phone]             Final diagnoses:   Schizophrenia, unspecified type        ED Disposition       ED Disposition   Discharge    Condition   Stable    Comment   --               This medical record created using voice recognition software.             Abhishek Hernandez MD  08/20/24 2079
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
## 2025-03-21 ENCOUNTER — APPOINTMENT (OUTPATIENT)
Dept: GENERAL RADIOLOGY | Facility: HOSPITAL | Age: 70
End: 2025-03-21
Payer: MEDICARE

## 2025-03-21 ENCOUNTER — HOSPITAL ENCOUNTER (EMERGENCY)
Facility: HOSPITAL | Age: 70
Discharge: OTHER INSTITUTION | End: 2025-03-22
Attending: EMERGENCY MEDICINE
Payer: MEDICARE

## 2025-03-21 DIAGNOSIS — F22 PARANOIA: ICD-10-CM

## 2025-03-21 DIAGNOSIS — F30.10 MANIC BEHAVIOR: Primary | ICD-10-CM

## 2025-03-21 DIAGNOSIS — F29 PSYCHOSIS, UNSPECIFIED PSYCHOSIS TYPE: ICD-10-CM

## 2025-03-21 LAB
ALBUMIN SERPL-MCNC: 4.1 G/DL (ref 3.5–5.2)
ALBUMIN/GLOB SERPL: 1.6 G/DL
ALP SERPL-CCNC: 72 U/L (ref 39–117)
ALT SERPL W P-5'-P-CCNC: 10 U/L (ref 1–33)
AMPHET+METHAMPHET UR QL: NEGATIVE
AMPHETAMINES UR QL: NEGATIVE
ANION GAP SERPL CALCULATED.3IONS-SCNC: 13.2 MMOL/L (ref 5–15)
APAP SERPL-MCNC: <5 MCG/ML (ref 0–30)
AST SERPL-CCNC: 14 U/L (ref 1–32)
BACTERIA UR QL AUTO: ABNORMAL /HPF
BARBITURATES UR QL SCN: NEGATIVE
BASOPHILS # BLD AUTO: 0.07 10*3/MM3 (ref 0–0.2)
BASOPHILS NFR BLD AUTO: 0.8 % (ref 0–1.5)
BENZODIAZ UR QL SCN: POSITIVE
BILIRUB SERPL-MCNC: 0.2 MG/DL (ref 0–1.2)
BILIRUB UR QL STRIP: NEGATIVE
BUN SERPL-MCNC: 26 MG/DL (ref 8–23)
BUN/CREAT SERPL: 28.3 (ref 7–25)
BUPRENORPHINE SERPL-MCNC: NEGATIVE NG/ML
CALCIUM SPEC-SCNC: 9.1 MG/DL (ref 8.6–10.5)
CANNABINOIDS SERPL QL: POSITIVE
CHLORIDE SERPL-SCNC: 105 MMOL/L (ref 98–107)
CLARITY UR: CLEAR
CO2 SERPL-SCNC: 19.8 MMOL/L (ref 22–29)
COCAINE UR QL: NEGATIVE
COLOR UR: ABNORMAL
CREAT SERPL-MCNC: 0.92 MG/DL (ref 0.57–1)
DEPRECATED RDW RBC AUTO: 44.9 FL (ref 37–54)
EGFRCR SERPLBLD CKD-EPI 2021: 66.3 ML/MIN/1.73
EOSINOPHIL # BLD AUTO: 0.09 10*3/MM3 (ref 0–0.4)
EOSINOPHIL NFR BLD AUTO: 1 % (ref 0.3–6.2)
ERYTHROCYTE [DISTWIDTH] IN BLOOD BY AUTOMATED COUNT: 13.2 % (ref 12.3–15.4)
ETHANOL BLD-MCNC: <10 MG/DL (ref 0–10)
ETHANOL UR QL: <0.01 %
FENTANYL UR-MCNC: NEGATIVE NG/ML
FLUAV RNA RESP QL NAA+PROBE: NOT DETECTED
FLUBV RNA RESP QL NAA+PROBE: NOT DETECTED
GLOBULIN UR ELPH-MCNC: 2.5 GM/DL
GLUCOSE SERPL-MCNC: 86 MG/DL (ref 65–99)
GLUCOSE UR STRIP-MCNC: NEGATIVE MG/DL
HCG INTACT+B SERPL-ACNC: 2.49 MIU/ML
HCT VFR BLD AUTO: 37.9 % (ref 34–46.6)
HGB BLD-MCNC: 12.4 G/DL (ref 12–15.9)
HGB UR QL STRIP.AUTO: NEGATIVE
HOLD SPECIMEN: NORMAL
HOLD SPECIMEN: NORMAL
HYALINE CASTS UR QL AUTO: ABNORMAL /LPF
IMM GRANULOCYTES # BLD AUTO: 0.02 10*3/MM3 (ref 0–0.05)
IMM GRANULOCYTES NFR BLD AUTO: 0.2 % (ref 0–0.5)
KETONES UR QL STRIP: ABNORMAL
LEUKOCYTE ESTERASE UR QL STRIP.AUTO: ABNORMAL
LYMPHOCYTES # BLD AUTO: 2.01 10*3/MM3 (ref 0.7–3.1)
LYMPHOCYTES NFR BLD AUTO: 22 % (ref 19.6–45.3)
MCH RBC QN AUTO: 30.3 PG (ref 26.6–33)
MCHC RBC AUTO-ENTMCNC: 32.7 G/DL (ref 31.5–35.7)
MCV RBC AUTO: 92.7 FL (ref 79–97)
METHADONE UR QL SCN: NEGATIVE
MONOCYTES # BLD AUTO: 0.52 10*3/MM3 (ref 0.1–0.9)
MONOCYTES NFR BLD AUTO: 5.7 % (ref 5–12)
NEUTROPHILS NFR BLD AUTO: 6.44 10*3/MM3 (ref 1.7–7)
NEUTROPHILS NFR BLD AUTO: 70.3 % (ref 42.7–76)
NITRITE UR QL STRIP: NEGATIVE
NRBC BLD AUTO-RTO: 0 /100 WBC (ref 0–0.2)
OPIATES UR QL: NEGATIVE
OXYCODONE UR QL SCN: NEGATIVE
PCP UR QL SCN: NEGATIVE
PH UR STRIP.AUTO: 6 [PH] (ref 5–8)
PLATELET # BLD AUTO: 260 10*3/MM3 (ref 140–450)
PMV BLD AUTO: 10 FL (ref 6–12)
POTASSIUM SERPL-SCNC: 3.4 MMOL/L (ref 3.5–5.2)
PROT SERPL-MCNC: 6.6 G/DL (ref 6–8.5)
PROT UR QL STRIP: ABNORMAL
QT INTERVAL: 440 MS
QTC INTERVAL: 492 MS
RBC # BLD AUTO: 4.09 10*6/MM3 (ref 3.77–5.28)
RBC # UR STRIP: ABNORMAL /HPF
REF LAB TEST METHOD: ABNORMAL
RSV RNA RESP QL NAA+PROBE: NOT DETECTED
SALICYLATES SERPL-MCNC: <0.3 MG/DL
SARS-COV-2 RNA RESP QL NAA+PROBE: NOT DETECTED
SODIUM SERPL-SCNC: 138 MMOL/L (ref 136–145)
SP GR UR STRIP: 1.03 (ref 1–1.03)
SQUAMOUS #/AREA URNS HPF: ABNORMAL /HPF
T4 FREE SERPL-MCNC: 0.79 NG/DL (ref 0.92–1.68)
TRICYCLICS UR QL SCN: POSITIVE
TSH SERPL DL<=0.05 MIU/L-ACNC: 1.15 UIU/ML (ref 0.27–4.2)
UROBILINOGEN UR QL STRIP: ABNORMAL
WBC # UR STRIP: ABNORMAL /HPF
WBC NRBC COR # BLD AUTO: 9.15 10*3/MM3 (ref 3.4–10.8)
WHOLE BLOOD HOLD COAG: NORMAL
WHOLE BLOOD HOLD SPECIMEN: NORMAL

## 2025-03-21 PROCEDURE — 80053 COMPREHEN METABOLIC PANEL: CPT | Performed by: EMERGENCY MEDICINE

## 2025-03-21 PROCEDURE — 87637 SARSCOV2&INF A&B&RSV AMP PRB: CPT

## 2025-03-21 PROCEDURE — 80164 ASSAY DIPROPYLACETIC ACD TOT: CPT | Performed by: NURSE PRACTITIONER

## 2025-03-21 PROCEDURE — 84702 CHORIONIC GONADOTROPIN TEST: CPT | Performed by: EMERGENCY MEDICINE

## 2025-03-21 PROCEDURE — 80179 DRUG ASSAY SALICYLATE: CPT | Performed by: EMERGENCY MEDICINE

## 2025-03-21 PROCEDURE — 93005 ELECTROCARDIOGRAM TRACING: CPT

## 2025-03-21 PROCEDURE — 84439 ASSAY OF FREE THYROXINE: CPT | Performed by: EMERGENCY MEDICINE

## 2025-03-21 PROCEDURE — 84443 ASSAY THYROID STIM HORMONE: CPT | Performed by: EMERGENCY MEDICINE

## 2025-03-21 PROCEDURE — 80178 ASSAY OF LITHIUM: CPT | Performed by: NURSE PRACTITIONER

## 2025-03-21 PROCEDURE — 99285 EMERGENCY DEPT VISIT HI MDM: CPT

## 2025-03-21 PROCEDURE — 81001 URINALYSIS AUTO W/SCOPE: CPT

## 2025-03-21 PROCEDURE — 80143 DRUG ASSAY ACETAMINOPHEN: CPT | Performed by: EMERGENCY MEDICINE

## 2025-03-21 PROCEDURE — 36415 COLL VENOUS BLD VENIPUNCTURE: CPT | Performed by: EMERGENCY MEDICINE

## 2025-03-21 PROCEDURE — 80307 DRUG TEST PRSMV CHEM ANLYZR: CPT | Performed by: EMERGENCY MEDICINE

## 2025-03-21 PROCEDURE — 71045 X-RAY EXAM CHEST 1 VIEW: CPT

## 2025-03-21 PROCEDURE — 82077 ASSAY SPEC XCP UR&BREATH IA: CPT | Performed by: EMERGENCY MEDICINE

## 2025-03-21 PROCEDURE — 85025 COMPLETE CBC W/AUTO DIFF WBC: CPT | Performed by: EMERGENCY MEDICINE
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
## 2025-03-22 VITALS
HEART RATE: 70 BPM | TEMPERATURE: 97.7 F | BODY MASS INDEX: 28.6 KG/M2 | WEIGHT: 155.42 LBS | SYSTOLIC BLOOD PRESSURE: 140 MMHG | OXYGEN SATURATION: 96 % | DIASTOLIC BLOOD PRESSURE: 62 MMHG | HEIGHT: 62 IN | RESPIRATION RATE: 16 BRPM

## 2025-03-22 LAB
LITHIUM SERPL-SCNC: <0.1 MMOL/L (ref 0.6–1.2)
VALPROATE SERPL-MCNC: <2.8 MCG/ML (ref 50–125)

## 2025-03-22 PROCEDURE — 63710000001 ONDANSETRON ODT 4 MG TABLET DISPERSIBLE: Performed by: NURSE PRACTITIONER

## 2025-03-22 RX ORDER — ACETAMINOPHEN 325 MG/1
650 TABLET ORAL ONCE
Status: COMPLETED | OUTPATIENT
Start: 2025-03-22 | End: 2025-03-22

## 2025-03-22 RX ORDER — ONDANSETRON 4 MG/1
4 TABLET, ORALLY DISINTEGRATING ORAL ONCE
Status: COMPLETED | OUTPATIENT
Start: 2025-03-22 | End: 2025-03-22

## 2025-03-22 RX ORDER — QUETIAPINE FUMARATE 200 MG/1
200 TABLET, FILM COATED ORAL ONCE
Status: COMPLETED | OUTPATIENT
Start: 2025-03-22 | End: 2025-03-22

## 2025-03-22 RX ORDER — NICOTINE 21 MG/24HR
1 PATCH, TRANSDERMAL 24 HOURS TRANSDERMAL ONCE
Status: DISCONTINUED | OUTPATIENT
Start: 2025-03-22 | End: 2025-03-22 | Stop reason: HOSPADM

## 2025-03-22 RX ADMIN — QUETIAPINE FUMARATE 200 MG: 200 TABLET ORAL at 04:10

## 2025-03-22 RX ADMIN — ACETAMINOPHEN 650 MG: 325 TABLET ORAL at 05:26

## 2025-03-22 RX ADMIN — ONDANSETRON 4 MG: 4 TABLET, ORALLY DISINTEGRATING ORAL at 05:26

## 2025-03-22 NOTE — NURSING NOTE
[facility]   Violent Restraint/Seclusion Face to Face Note    Patient Name: Sasha Fuller  : 1955  MRN: [id number]  Primary Care Physician:  Lei Coreas MD  Date of admission: 3/21/2025    Face to Face Evaluation:  I have completed a face to face evaluation of the the patient for dangerous or threatening behavior within one hour of restraint placement.    Patient exhibited the following behaviors leading to the need for the restraint: aggressive behavior and actions.    Time restraints were placed: 414    Time face to face was completed: 424    Assessment of Patients Immediate Situation: Patient is talking non stop and non agreeable to remain in the room or calm.  4 point NL restraints in place. VSS, denies pain or discomort,asking about EKG results,Medicated.    Type of intervention: Mechanical Restraint    Assessment of Patient's reaction to Intervention: patient has been medicated and no acute physical or psychological distress noted.    Evaluation of Patient's Medical and Behavioral Conditions: Yes    I have reviewed all available relevant medical and behavior history for the patient including      Complete Review of Systems: Yes   Review of Patient's Medical History: Yes   Drugs and Alcohol: Yes   Medications: Yes   Recent Labs and Diagnostics: Yes   Allergies: Yes    Restraints/Seclusion: maintained    Discontinued Indications: N/A    I have discussed patient situation, current status, restraint use and recommendation for continued restraint with Carol provider  at 0430.   Judy Maya RN 25 04:35 EDT
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
## 2025-04-02 LAB
QT INTERVAL: 440 MS
QTC INTERVAL: 492 MS

## 2025-04-11 ENCOUNTER — APPOINTMENT (OUTPATIENT)
Dept: GENERAL RADIOLOGY | Facility: HOSPITAL | Age: 70
End: 2025-04-11
Payer: MEDICARE

## 2025-04-11 ENCOUNTER — HOSPITAL ENCOUNTER (EMERGENCY)
Facility: HOSPITAL | Age: 70
Discharge: HOME | End: 2025-04-12
Attending: EMERGENCY MEDICINE
Payer: MEDICARE

## 2025-04-11 DIAGNOSIS — F31.9 BIPOLAR 1 DISORDER: ICD-10-CM

## 2025-04-11 DIAGNOSIS — F20.9 SCHIZOPHRENIA, UNSPECIFIED TYPE: Primary | ICD-10-CM

## 2025-04-11 LAB
ALBUMIN SERPL-MCNC: 3.5 G/DL (ref 3.5–5.2)
ALBUMIN/GLOB SERPL: 1 G/DL
ALP SERPL-CCNC: 68 U/L (ref 39–117)
ALT SERPL W P-5'-P-CCNC: 16 U/L (ref 1–33)
AMPHET+METHAMPHET UR QL: NEGATIVE
AMPHETAMINES UR QL: NEGATIVE
ANION GAP SERPL CALCULATED.3IONS-SCNC: 10.5 MMOL/L (ref 5–15)
APAP SERPL-MCNC: <5 MCG/ML (ref 0–30)
AST SERPL-CCNC: 20 U/L (ref 1–32)
BARBITURATES UR QL SCN: NEGATIVE
BASOPHILS # BLD AUTO: 0.05 10*3/MM3 (ref 0–0.2)
BASOPHILS NFR BLD AUTO: 0.4 % (ref 0–1.5)
BENZODIAZ UR QL SCN: NEGATIVE
BILIRUB SERPL-MCNC: 0.2 MG/DL (ref 0–1.2)
BILIRUB UR QL STRIP: NEGATIVE
BUN SERPL-MCNC: 29 MG/DL (ref 8–23)
BUN/CREAT SERPL: 44.6 (ref 7–25)
BUPRENORPHINE SERPL-MCNC: NEGATIVE NG/ML
CALCIUM SPEC-SCNC: 8.8 MG/DL (ref 8.6–10.5)
CANNABINOIDS SERPL QL: POSITIVE
CHLORIDE SERPL-SCNC: 107 MMOL/L (ref 98–107)
CLARITY UR: ABNORMAL
CO2 SERPL-SCNC: 24.5 MMOL/L (ref 22–29)
COCAINE UR QL: NEGATIVE
COLOR UR: YELLOW
CREAT SERPL-MCNC: 0.65 MG/DL (ref 0.57–1)
DEPRECATED RDW RBC AUTO: 50.6 FL (ref 37–54)
EGFRCR SERPLBLD CKD-EPI 2021: 93.7 ML/MIN/1.73
EOSINOPHIL # BLD AUTO: 0 10*3/MM3 (ref 0–0.4)
EOSINOPHIL NFR BLD AUTO: 0 % (ref 0.3–6.2)
ERYTHROCYTE [DISTWIDTH] IN BLOOD BY AUTOMATED COUNT: 14.3 % (ref 12.3–15.4)
ETHANOL BLD-MCNC: <10 MG/DL (ref 0–10)
ETHANOL UR QL: <0.01 %
FENTANYL UR-MCNC: NEGATIVE NG/ML
FLUAV RNA RESP QL NAA+PROBE: NOT DETECTED
FLUBV RNA RESP QL NAA+PROBE: NOT DETECTED
GLOBULIN UR ELPH-MCNC: 3.4 GM/DL
GLUCOSE SERPL-MCNC: 114 MG/DL (ref 65–99)
GLUCOSE UR STRIP-MCNC: NEGATIVE MG/DL
HCT VFR BLD AUTO: 40 % (ref 34–46.6)
HGB BLD-MCNC: 12.4 G/DL (ref 12–15.9)
HGB UR QL STRIP.AUTO: NEGATIVE
HOLD SPECIMEN: NORMAL
IMM GRANULOCYTES # BLD AUTO: 0.1 10*3/MM3 (ref 0–0.05)
IMM GRANULOCYTES NFR BLD AUTO: 0.9 % (ref 0–0.5)
KETONES UR QL STRIP: ABNORMAL
LEUKOCYTE ESTERASE UR QL STRIP.AUTO: NEGATIVE
LYMPHOCYTES # BLD AUTO: 0.69 10*3/MM3 (ref 0.7–3.1)
LYMPHOCYTES NFR BLD AUTO: 6.2 % (ref 19.6–45.3)
MAGNESIUM SERPL-MCNC: 2.2 MG/DL (ref 1.6–2.4)
MCH RBC QN AUTO: 30.1 PG (ref 26.6–33)
MCHC RBC AUTO-ENTMCNC: 31 G/DL (ref 31.5–35.7)
MCV RBC AUTO: 97.1 FL (ref 79–97)
METHADONE UR QL SCN: NEGATIVE
MONOCYTES # BLD AUTO: 0.39 10*3/MM3 (ref 0.1–0.9)
MONOCYTES NFR BLD AUTO: 3.5 % (ref 5–12)
NEUTROPHILS NFR BLD AUTO: 89 % (ref 42.7–76)
NEUTROPHILS NFR BLD AUTO: 9.95 10*3/MM3 (ref 1.7–7)
NITRITE UR QL STRIP: NEGATIVE
NRBC BLD AUTO-RTO: 0 /100 WBC (ref 0–0.2)
OPIATES UR QL: NEGATIVE
OXYCODONE UR QL SCN: NEGATIVE
PCP UR QL SCN: NEGATIVE
PH UR STRIP.AUTO: 6 [PH] (ref 5–8)
PLATELET # BLD AUTO: 441 10*3/MM3 (ref 140–450)
PMV BLD AUTO: 8.8 FL (ref 6–12)
POTASSIUM SERPL-SCNC: 3 MMOL/L (ref 3.5–5.2)
PROCALCITONIN SERPL-MCNC: 0.05 NG/ML (ref 0–0.25)
PROT SERPL-MCNC: 6.9 G/DL (ref 6–8.5)
PROT UR QL STRIP: ABNORMAL
RBC # BLD AUTO: 4.12 10*6/MM3 (ref 3.77–5.28)
RSV RNA RESP QL NAA+PROBE: NOT DETECTED
SALICYLATES SERPL-MCNC: <0.3 MG/DL
SARS-COV-2 RNA RESP QL NAA+PROBE: NOT DETECTED
SODIUM SERPL-SCNC: 142 MMOL/L (ref 136–145)
SP GR UR STRIP: >=1.03 (ref 1–1.03)
TRICYCLICS UR QL SCN: POSITIVE
UROBILINOGEN UR QL STRIP: ABNORMAL
WBC NRBC COR # BLD AUTO: 11.18 10*3/MM3 (ref 3.4–10.8)
WHOLE BLOOD HOLD COAG: NORMAL
WHOLE BLOOD HOLD SPECIMEN: NORMAL

## 2025-04-11 PROCEDURE — 82607 VITAMIN B-12: CPT

## 2025-04-11 PROCEDURE — 82077 ASSAY SPEC XCP UR&BREATH IA: CPT

## 2025-04-11 PROCEDURE — 99284 EMERGENCY DEPT VISIT MOD MDM: CPT

## 2025-04-11 PROCEDURE — 80179 DRUG ASSAY SALICYLATE: CPT

## 2025-04-11 PROCEDURE — 81003 URINALYSIS AUTO W/O SCOPE: CPT

## 2025-04-11 PROCEDURE — 83735 ASSAY OF MAGNESIUM: CPT

## 2025-04-11 PROCEDURE — 85025 COMPLETE CBC W/AUTO DIFF WBC: CPT

## 2025-04-11 PROCEDURE — 80143 DRUG ASSAY ACETAMINOPHEN: CPT

## 2025-04-11 PROCEDURE — 71045 X-RAY EXAM CHEST 1 VIEW: CPT

## 2025-04-11 PROCEDURE — 93005 ELECTROCARDIOGRAM TRACING: CPT

## 2025-04-11 PROCEDURE — 80307 DRUG TEST PRSMV CHEM ANLYZR: CPT

## 2025-04-11 PROCEDURE — 93010 ELECTROCARDIOGRAM REPORT: CPT | Performed by: INTERNAL MEDICINE

## 2025-04-11 PROCEDURE — 87637 SARSCOV2&INF A&B&RSV AMP PRB: CPT

## 2025-04-11 PROCEDURE — 80053 COMPREHEN METABOLIC PANEL: CPT

## 2025-04-11 PROCEDURE — 25810000003 SODIUM CHLORIDE 0.9 % SOLUTION

## 2025-04-11 PROCEDURE — 84145 PROCALCITONIN (PCT): CPT

## 2025-04-11 RX ORDER — POTASSIUM CHLORIDE 750 MG/1
40 CAPSULE, EXTENDED RELEASE ORAL ONCE
Status: COMPLETED | OUTPATIENT
Start: 2025-04-11 | End: 2025-04-11

## 2025-04-11 RX ORDER — SODIUM CHLORIDE 0.9 % (FLUSH) 0.9 %
10 SYRINGE (ML) INJECTION AS NEEDED
Status: DISCONTINUED | OUTPATIENT
Start: 2025-04-11 | End: 2025-04-12 | Stop reason: HOSPADM

## 2025-04-11 RX ADMIN — POTASSIUM CHLORIDE 40 MEQ: 750 CAPSULE, EXTENDED RELEASE ORAL at 20:03

## 2025-04-11 RX ADMIN — SODIUM CHLORIDE 1000 ML: 9 INJECTION, SOLUTION INTRAVENOUS at 20:04

## 2025-04-11 NOTE — ED PROVIDER NOTES
Time: 4:37 PM EDT  Date of encounter:  4/11/2025  Independent Historian/Clinical History and Information was obtained by:   Patient    History is limited by: Cognitive Impairment    Chief Complaint: Psych eval      History of Present Illness:  Patient is a 70 y.o. year old female who presents to the emergency department for evaluation of psychiatric evaluation.  Patient claims her neighbors have been giving her powdered mccurdy killer.  Patient seems very tangential in her words, and presents with flight of ideas.  Patient eventually follows commands appropriately.    Patient states that her neighbors and family members of giving her the mccurdy powder.  She states they are placing in her coffee, other drinks and foods.  Patient states she has been taking all of her medications as prescribed.  Patient denies SI or HI.  Patient is alert and oriented x 4.      Patient Care Team  Primary Care Provider: Lei Coreas MD    Past Medical History:     No Known Allergies  Past Medical History:   Diagnosis Date    Anxiety     Bipolar 1 disorder     GERD (gastroesophageal reflux disease)     Hypertension     Seizures      Past Surgical History:   Procedure Laterality Date    BREAST SURGERY      HYSTERECTOMY       History reviewed. No pertinent family history.    Home Medications:  Prior to Admission medications    Medication Sig Start Date End Date Taking? Authorizing Provider   amitriptyline (ELAVIL) 25 MG tablet Take 1 tablet by mouth Every Night.    ProviderElida MD   aspirin 81 MG chewable tablet Chew 81 mg Daily.    ProviderElida MD   benztropine (COGENTIN) 1 MG tablet Take 1 mg by mouth 2 (Two) Times a Day.    Elida Donis MD   cetirizine (zyrTEC) 10 MG tablet Take 10 mg by mouth Daily.    Elida Donis MD   divalproex (DEPAKOTE) 500 MG DR tablet Take 500 mg by mouth Every Morning.    Elida Donis MD   divalproex (DEPAKOTE) 500 MG DR tablet Take 1,000 mg by mouth every night at  "bedtime.    Elida Donis MD   escitalopram (LEXAPRO) 10 MG tablet Take 10 mg by mouth Daily.    Elida Donis MD   FLUoxetine (PROzac) 20 MG capsule Take 1 capsule by mouth 2 (Two) Times a Day.    Elida Donis MD   pantoprazole (PROTONIX) 40 MG EC tablet Take 40 mg by mouth Daily.    Elida Donis MD   QUEtiapine (SEROquel) 300 MG tablet Take 600 mg by mouth Every Night.    Elida Donis MD   rosuvastatin (CRESTOR) 20 MG tablet Take 1 tablet by mouth Daily.    Elida Donis MD   topiramate (TOPAMAX) 100 MG tablet Take 2 tablets by mouth 2 (Two) Times a Day.    Elida Donis MD   vitamin D (ERGOCALCIFEROL) 1.25 MG (57928 UT) capsule capsule Take 50,000 Units by mouth 2 (Two) Times a Week.    Elida Donis MD        Social History:   Social History     Tobacco Use    Smoking status: Every Day     Current packs/day: 0.50     Types: Cigarettes   Substance Use Topics    Alcohol use: Not Currently    Drug use: Yes         Review of Systems:  Review of Systems   Unable to perform ROS: Mental status change   Constitutional:  Negative for fever.   Respiratory:  Negative for cough.         Physical Exam:  /71   Pulse 69   Temp 97.9 °F (36.6 °C)   Resp 20   Ht 157.5 cm (62.01\")   Wt 69.1 kg (152 lb 5.4 oz)   SpO2 97%   BMI 27.86 kg/m²     Physical Exam  Constitutional:       General: She is not in acute distress.     Appearance: Normal appearance. She is not ill-appearing, toxic-appearing or diaphoretic.   HENT:      Head: Normocephalic and atraumatic.      Mouth/Throat:      Mouth: Mucous membranes are moist.      Pharynx: Oropharynx is clear.   Eyes:      Extraocular Movements: Extraocular movements intact.      Conjunctiva/sclera: Conjunctivae normal.      Pupils: Pupils are equal, round, and reactive to light.   Cardiovascular:      Rate and Rhythm: Normal rate and regular rhythm.      Heart sounds: Normal heart sounds.   Pulmonary:      Effort: " Pulmonary effort is normal. No respiratory distress.      Breath sounds: Normal breath sounds. No stridor. No wheezing, rhonchi or rales.   Chest:      Chest wall: No tenderness.   Musculoskeletal:      Cervical back: Normal range of motion.   Skin:     General: Skin is warm and dry.   Neurological:      General: No focal deficit present.      Mental Status: She is alert and oriented to person, place, and time.   Psychiatric:         Attention and Perception: She is inattentive.         Mood and Affect: Mood is elated.         Speech: Speech is tangential.         Behavior: Behavior is hyperactive. Behavior is cooperative.         Thought Content: Thought content is delusional. Thought content does not include homicidal or suicidal ideation.         Cognition and Memory: Cognition is impaired. She exhibits impaired recent memory and impaired remote memory.         Judgment: Judgment is inappropriate.                Medical Decision Making:      Comorbidities that affect care:    Bipolar 1, anxiety, Hypertension    External Notes reviewed:    Previous ED Note: Her last [facility] visit was 3/21/2025 and she was transferred for psychosis      The following orders were placed and all results were independently analyzed by me:  Orders Placed This Encounter   Procedures    COVID-19, FLU A/B, RSV PCR 1 HR TAT - Swab, Nasopharynx    XR Chest 1 View    Pearce Draw    Pearce Draw    Comprehensive Metabolic Panel    Acetaminophen Level    Ethanol    Salicylate Level    Urine Drug Screen - Urine, Clean Catch    CBC Auto Differential    Fentanyl, Urine - Urine, Clean Catch    Vitamin B12    Magnesium    Urinalysis With Microscopic If Indicated (No Culture) - Urine, Clean Catch    Procalcitonin    Vital Signs    Continuous Pulse Oximetry    POC Glucose Once    ECG 12 Lead Altered Mental Status    Green Top (Gel)    Lavender Top    Gold Top - SST    Light Blue Top    CBC & Differential    Green Top (Gel)       Medications Given in  the Emergency Department:  Medications   sodium chloride 0.9 % bolus 1,000 mL (0 mL Intravenous Stopped 4/11/25 2214)   potassium chloride (MICRO-K/KLOR-CON) CR capsule (40 mEq Oral Given 4/11/25 2003)   QUEtiapine (SEROquel) tablet 100 mg (100 mg Oral Given 4/12/25 0739)   divalproex (DEPAKOTE) DR tablet 500 mg (500 mg Oral Given 4/12/25 1125)   FLUoxetine (PROzac) capsule 20 mg (20 mg Oral Given 4/12/25 1124)   topiramate (TOPAMAX) tablet 200 mg (200 mg Oral Given 4/12/25 1124)   cholecalciferol (VITAMIN D3) capsule 50,000 Units (50,000 Units Oral Given 4/12/25 1124)        ED Course:    ED Course as of 04/13/25 1512   Fri Apr 11, 2025   1857 Acetaminophen Level [AJ]   2144 Social work has evaluated the patient.  She states that patient does not qualify to go to geriatric psych due to patient not being suicidal or homicidal.    Patient states she wants to go to [facility] but does not know why.  Her patient she lives with her family but prior chart says that she lives alone.  Per social work states that this is how she always acts. [AJ]   2157 Patient denies fevers and cough. [AJ]   2211 Consulted with Quinn, he believes this is all psych related.  He states patient does not need to be admitted for possible pneumonia nor does she qualify for psychiatric admission.  He states patient can be discharged. [AJ]   Sat Apr 12, 2025   0038 Charge nurse notified me that patient's daughter came to  the patient and patient it is enraged that her daughter is here.  Is that her daughter has nothing to do with her.  Daughter does not feel comfortable taking patient home.    I have asked the patient if she is willing to go to a facility and might have to stay the night here she states she is willing. [AJ]   0427 The patient was requesting something to help her sleep.  P.o. Ativan 0.5 mg was ordered and she refused. [TC]   0852 Awaiting acceptance at a facility.  [CM]   1331 Per ER  all geripsych  facilities are full or deny acceptance. Pt reports she has been recently evicted and has no place to go. She has flight of ideas, denies SI/HI. Will speak to attending to discuss dispo.  [CM]   8198 Consulted Dr. Olivarez for medical/social admission since pt reports she has been evicted and she also does not want her daughter contacted or involved in her care. Dr. Olivarez does not want to admit, states she needs psych services. He spoke to Dr. Gonsalves as well. Dr. Gonsalves spoke to ER  and the plan is for  to try to obtain MIW [CM]   1617 MIW paperwork completed per ER . Awaiting  dept to serve MIW [CM]   5949 Patient on MIW via 's.  Transported to facility without difficulty. [AS]      ED Course User Index  [AJ] Tristan Benítez PA-C  [AS] Christina Andrews PA-C  [CM] Kaleb Vail APRN  [TC] Adrianne Aguilera APRN       Labs:    Lab Results (last 24 hours)       Procedure Component Value Units Date/Time    CBC & Differential [[id number]]  (Normal) Collected: 04/12/25 2030    Specimen: Blood Updated: 04/12/25 2043    Narrative:      The following orders were created for panel order CBC & Differential.  Procedure                               Abnormality         Status                     ---------                               -----------         ------                     CBC Auto Differential[[id number]]        Normal              Final result                 Please view results for these tests on the individual orders.    Ethanol [[id number]] Collected: 04/12/25 2030    Specimen: Blood Updated: 04/12/25 2059     Ethanol <10 mg/dL      Ethanol % <0.010 %     Narrative:      Ethanol (Plasma)  <10 Essentially Negative    Toxic Concentrations           mg/dL    Flushing, slowing of reflexes    Impaired visual activity         Depression of CNS              >100  Possible Coma                  >300       Acetaminophen Level [[id number]]   (Normal) Collected: 04/12/25 2030    Specimen: Blood Updated: 04/12/25 2059     Acetaminophen <5.0 mcg/mL     Salicylate Level [[id number]]  (Normal) Collected: 04/12/25 2030    Specimen: Blood Updated: 04/12/25 2059     Salicylate <0.3 mg/dL     TSH [[id number]]  (Normal) Collected: 04/12/25 2030    Specimen: Blood Updated: 04/12/25 2105     TSH 2.750 uIU/mL     T4, Free [[id number]]  (Normal) Collected: 04/12/25 2030    Specimen: Blood Updated: 04/12/25 2302     Free T4 1.33 ng/dL     hCG, Quantitative, Pregnancy [[id number]] Collected: 04/12/25 2030    Specimen: Blood Updated: 04/12/25 2056     HCG Quantitative 2.26 mIU/mL     Narrative:      HCG Ranges by Gestational Age    Females - non-pregnant premenopausal   </= 1mIU/mL HCG  Females - postmenopausal               </= 7mIU/mL HCG    3 Weeks       5.4   -      72 mIU/mL  4 Weeks      10.2   -     708 mIU/mL  5 Weeks       217   -   8,245 mIU/mL  6 Weeks       152   -  32,177 mIU/mL  7 Weeks     4,059   - 153,767 mIU/mL  8 Weeks    31,366   - 149,094 mIU/mL  9 Weeks    59,109   - 135,901 mIU/mL  10 Weeks   44,186   - 170,409 mIU/mL  12 Weeks   27,107   - 201,615 mIU/mL  14 Weeks   24,302   -  93,646 mIU/mL  15 Weeks   12,540   -  69,747 mIU/mL  16 Weeks    8,904   -  55,332 mIU/mL  17 Weeks    8,240   -  51,793 mIU/mL  18 Weeks    9,649   -  55,271 mIU/mL      CBC Auto Differential [[id number]]  (Normal) Collected: 04/12/25 2030    Specimen: Blood Updated: 04/12/25 2043     WBC 9.75 10*3/mm3      RBC 4.21 10*6/mm3      Hemoglobin 12.7 g/dL      Hematocrit 39.1 %      MCV 92.9 fL      MCH 30.2 pg      MCHC 32.5 g/dL      RDW 13.9 %      RDW-SD 46.7 fl      MPV 8.3 fL      Platelets 417 10*3/mm3      Neutrophil % 69.0 %      Lymphocyte % 20.3 %      Monocyte % 8.9 %      Eosinophil % 0.3 %      Basophil % 1.1 %      Immature Grans % 0.4 %      Neutrophils, Absolute 6.72 10*3/mm3      Lymphocytes, Absolute 1.98 10*3/mm3      Monocytes, Absolute 0.87 10*3/mm3       Eosinophils, Absolute 0.03 10*3/mm3      Basophils, Absolute 0.11 10*3/mm3      Immature Grans, Absolute 0.04 10*3/mm3      nRBC 0.0 /100 WBC     Urine Drug Screen - Urine, Clean Catch [[id number]]  (Abnormal) Collected: 04/12/25 2147    Specimen: Urine, Clean Catch Updated: 04/12/25 2214     THC, Screen, Urine Positive     Phencyclidine (PCP), Urine Negative     Cocaine Screen, Urine Negative     Methamphetamine, Ur Negative     Opiate Screen Negative     Amphetamine Screen, Urine Negative     Benzodiazepine Screen, Urine Negative     Tricyclic Antidepressants Screen Positive     Methadone Screen, Urine Negative     Barbiturates Screen, Urine Negative     Oxycodone Screen, Urine Negative     Buprenorphine, Screen, Urine Negative    Narrative:      Cutoff For Drugs Screened:    Amphetamines               500 ng/ml  Barbiturates               200 ng/ml  Benzodiazepines            150 ng/ml  Cocaine                    150 ng/ml  Methadone                  200 ng/ml  Opiates                    100 ng/ml  Phencyclidine               25 ng/ml  THC                         50 ng/ml  Methamphetamine            500 ng/ml  Tricyclic Antidepressants  300 ng/ml  Oxycodone                  100 ng/ml  Buprenorphine               10 ng/ml    The normal value for all drugs tested is negative. This report includes unconfirmed screening results, with the cutoff values listed, to be used for medical treatment purposes only.  Unconfirmed results must not be used for non-medical purposes such as employment or legal testing.  Clinical consideration should be applied to any drug of abuse test, particularly when unconfirmed results are used.      Fentanyl, Urine - Urine, Clean Catch [[id number]]  (Normal) Collected: 04/12/25 2147    Specimen: Urine, Clean Catch Updated: 04/12/25 2210     Fentanyl, Urine Negative    Narrative:      Negative Threshold:      Fentanyl 5 ng/mL     The normal value for the drug tested is negative. This report  includes final unconfirmed screening results to be used for medical treatment purposes only. Unconfirmed results must not be used for non-medical purposes such as employment or legal testing. Clinical consideration should be applied to any drug of abuse test, particularly when unconfirmed results are used.           Comprehensive Metabolic Panel [[id number]]  (Abnormal) Collected: 04/12/25 2207    Specimen: Blood Updated: 04/12/25 2232     Glucose 96 mg/dL      BUN 15 mg/dL      Creatinine 0.67 mg/dL      Sodium 135 mmol/L      Potassium 3.4 mmol/L      Chloride 103 mmol/L      CO2 22.4 mmol/L      Calcium 8.9 mg/dL      Total Protein 6.6 g/dL      Albumin 3.4 g/dL      ALT (SGPT) 17 U/L      AST (SGOT) 20 U/L      Alkaline Phosphatase 62 U/L      Total Bilirubin 0.2 mg/dL      Globulin 3.2 gm/dL      A/G Ratio 1.1 g/dL      BUN/Creatinine Ratio 22.4     Anion Gap 9.6 mmol/L      eGFR 93.0 mL/min/1.73     Narrative:      GFR Categories in Chronic Kidney Disease (CKD)      GFR Category          GFR (mL/min/1.73)    Interpretation  G1                     90 or greater         Normal or high (1)  G2                      60-89                Mild decrease (1)  G3a                   45-59                Mild to moderate decrease  G3b                   30-44                Moderate to severe decrease  G4                    15-29                Severe decrease  G5                    14 or less           Kidney failure          (1)In the absence of evidence of kidney disease, neither GFR category G1 or G2 fulfill the criteria for CKD.    eGFR calculation 2021 CKD-EPI creatinine equation, which does not include race as a factor             Imaging:    No Radiology Exams Resulted Within Past 24 Hours        Differential Diagnosis and Discussion:    Psychiatric: Differential diagnosis includes but is not limited to depression, psychosis, bipolar disorder, anxiety, manic episode, schizophrenia, and substance  abuse.    PROCEDURES:    Labs were collected in the emergency department and all labs were reviewed and interpreted by me.  X-ray were performed in the emergency department and all X-ray impressions were independently interpreted by me.  An EKG was performed and the EKG was interpreted by me.  An EKG was performed and the EKG was interpreted by supervising attending.    ECG 12 Lead Altered Mental Status   Final Result   HEART RATE=66  bpm   RR Ulrsduga=241  ms   DC Vkauofeo=673  ms   P Horizontal Axis=14  deg   P Front Axis=52  deg   QRSD Interval=95  ms   QT Kctdzrcy=412  ms   AJjA=356  ms   QRS Axis=-15  deg   T Wave Axis=  deg   - BORDERLINE ECG -   Sinus rhythm   Borderline  left axis deviation   Low voltage, precordial leads   Abnormal R-wave progression, early transition   Borderline  repolarization abnormality   When compared with ECG of 21-Mar-2025 18:56:07,   No significant interval change   Electronically Signed By: Rosalino Dunlap ([facility]) 2025-04-12 10:28:12   Date and Time of Study:2025-04-11 18:48:41          Procedures    MDM  Number of Diagnoses or Management Options  Bipolar 1 disorder  Schizophrenia, unspecified type  Diagnosis management comments: CURB-65 Score for Pneumonia Severity - MDCalc  Calculated on Apr 11 2025 9:45 PM  3 points -> Severe risk group: 14.0% 30-day mortality. Consider inpatient treatment with possible intensive care admission.           Amount and/or Complexity of Data Reviewed  Clinical lab tests: reviewed  Tests in the radiology section of CPT®: reviewed  Tests in the medicine section of CPT®: reviewed                       Patient Care Considerations:    SEPSIS was considered but is NOT present in the emergency department as SIRS criteria is not present. ANTIBIOTICS: I considered prescribing antibiotics as an outpatient however no bacterial focus of infection was found.      Consultants/Shared Management Plan:    Consultant: I have discussed the case with Dr. Ball who  states he believes this is all psych related due to patient's history of bipolar 1 and schizophrenia.  He states patient does not meet admission criteria for psych and does not need to be admitted for pneumonia.  Believes patient's symptoms are coming from her psych history.  He advises to discharge patient home.    Social Determinants of Health:    Patient is independent, reliable, and has access to care.       Disposition and Care Coordination:    Discharged: I considered escalation of care by admitting this patient to the hospital, however patient's PCP states patient to be discharged home    I have explained the patient´s condition, diagnoses and treatment plan based on the information available to me at this time. I have answered questions and addressed any concerns. The patient has a good  understanding of the patient´s diagnosis, condition, and treatment plan as can be expected at this point. The vital signs have been stable. The patient´s condition is stable and appropriate for discharge from the emergency department.      The patient will pursue further outpatient evaluation with the primary care physician or other designated or consulting physician as outlined in the discharge instructions. They are agreeable to this plan of care and follow-up instructions have been explained in detail. The patient has received these instructions in written format and has expressed an understanding of the discharge instructions. The patient is aware that any significant change in condition or worsening of symptoms should prompt an immediate return to this or the closest emergency department or call to 911.    Final diagnoses:   Schizophrenia, unspecified type   Bipolar 1 disorder        ED Disposition       ED Disposition   Discharge    Condition   Stable    Comment   --               This medical record created using voice recognition software.            Tristan Benítez PA-C  04/11/25 8011       Tristan Benítez  STACIA  04/12/25 0121       Tristan Benítez PA-C  04/13/25 1516

## 2025-04-11 NOTE — ED PROVIDER NOTES
Subjective   History of Present Illness    Review of Systems    Past Medical History:   Diagnosis Date    Anxiety     Bipolar 1 disorder     GERD (gastroesophageal reflux disease)     Hypertension     Seizures        No Known Allergies    Past Surgical History:   Procedure Laterality Date    BREAST SURGERY      HYSTERECTOMY         History reviewed. No pertinent family history.    Social History     Socioeconomic History    Marital status: Single   Tobacco Use    Smoking status: Every Day     Current packs/day: 0.50     Types: Cigarettes   Substance and Sexual Activity    Alcohol use: Not Currently    Drug use: Yes    Sexual activity: Defer           Objective   Physical Exam    Procedures           ED Course                                                       Medical Decision Making  Risk  Prescription drug management.        Final diagnoses:   None       ED Disposition  ED Disposition       None            No follow-up provider specified.       Medication List      No changes were made to your prescriptions during this visit.

## 2025-04-12 ENCOUNTER — HOSPITAL ENCOUNTER (EMERGENCY)
Facility: HOSPITAL | Age: 70
Discharge: HOME | End: 2025-04-13
Attending: EMERGENCY MEDICINE
Payer: MEDICARE

## 2025-04-12 VITALS
HEART RATE: 68 BPM | TEMPERATURE: 98.8 F | HEIGHT: 62 IN | SYSTOLIC BLOOD PRESSURE: 133 MMHG | WEIGHT: 150.79 LBS | OXYGEN SATURATION: 98 % | DIASTOLIC BLOOD PRESSURE: 71 MMHG | BODY MASS INDEX: 27.75 KG/M2 | RESPIRATION RATE: 18 BRPM

## 2025-04-12 VITALS
RESPIRATION RATE: 20 BRPM | WEIGHT: 152.34 LBS | DIASTOLIC BLOOD PRESSURE: 71 MMHG | HEIGHT: 62 IN | TEMPERATURE: 97.9 F | BODY MASS INDEX: 28.03 KG/M2 | OXYGEN SATURATION: 97 % | HEART RATE: 69 BPM | SYSTOLIC BLOOD PRESSURE: 134 MMHG

## 2025-04-12 DIAGNOSIS — F29 PSYCHOSIS, UNSPECIFIED PSYCHOSIS TYPE: Primary | ICD-10-CM

## 2025-04-12 DIAGNOSIS — F31.9 BIPOLAR 1 DISORDER: ICD-10-CM

## 2025-04-12 LAB
ALBUMIN SERPL-MCNC: 3.4 G/DL (ref 3.5–5.2)
ALBUMIN/GLOB SERPL: 1.1 G/DL
ALP SERPL-CCNC: 62 U/L (ref 39–117)
ALT SERPL W P-5'-P-CCNC: 17 U/L (ref 1–33)
AMPHET+METHAMPHET UR QL: NEGATIVE
AMPHETAMINES UR QL: NEGATIVE
ANION GAP SERPL CALCULATED.3IONS-SCNC: 9.6 MMOL/L (ref 5–15)
APAP SERPL-MCNC: <5 MCG/ML (ref 0–30)
AST SERPL-CCNC: 20 U/L (ref 1–32)
BARBITURATES UR QL SCN: NEGATIVE
BASOPHILS # BLD AUTO: 0.11 10*3/MM3 (ref 0–0.2)
BASOPHILS NFR BLD AUTO: 1.1 % (ref 0–1.5)
BENZODIAZ UR QL SCN: NEGATIVE
BILIRUB SERPL-MCNC: 0.2 MG/DL (ref 0–1.2)
BUN SERPL-MCNC: 15 MG/DL (ref 8–23)
BUN/CREAT SERPL: 22.4 (ref 7–25)
BUPRENORPHINE SERPL-MCNC: NEGATIVE NG/ML
CALCIUM SPEC-SCNC: 8.9 MG/DL (ref 8.6–10.5)
CANNABINOIDS SERPL QL: POSITIVE
CHLORIDE SERPL-SCNC: 103 MMOL/L (ref 98–107)
CO2 SERPL-SCNC: 22.4 MMOL/L (ref 22–29)
COCAINE UR QL: NEGATIVE
CREAT SERPL-MCNC: 0.67 MG/DL (ref 0.57–1)
DEPRECATED RDW RBC AUTO: 46.7 FL (ref 37–54)
EGFRCR SERPLBLD CKD-EPI 2021: 93 ML/MIN/1.73
EOSINOPHIL # BLD AUTO: 0.03 10*3/MM3 (ref 0–0.4)
EOSINOPHIL NFR BLD AUTO: 0.3 % (ref 0.3–6.2)
ERYTHROCYTE [DISTWIDTH] IN BLOOD BY AUTOMATED COUNT: 13.9 % (ref 12.3–15.4)
ETHANOL BLD-MCNC: <10 MG/DL (ref 0–10)
ETHANOL UR QL: <0.01 %
FENTANYL UR-MCNC: NEGATIVE NG/ML
GLOBULIN UR ELPH-MCNC: 3.2 GM/DL
GLUCOSE SERPL-MCNC: 96 MG/DL (ref 65–99)
HCG INTACT+B SERPL-ACNC: 2.26 MIU/ML
HCT VFR BLD AUTO: 39.1 % (ref 34–46.6)
HGB BLD-MCNC: 12.7 G/DL (ref 12–15.9)
HOLD SPECIMEN: NORMAL
HOLD SPECIMEN: NORMAL
IMM GRANULOCYTES # BLD AUTO: 0.04 10*3/MM3 (ref 0–0.05)
IMM GRANULOCYTES NFR BLD AUTO: 0.4 % (ref 0–0.5)
LYMPHOCYTES # BLD AUTO: 1.98 10*3/MM3 (ref 0.7–3.1)
LYMPHOCYTES NFR BLD AUTO: 20.3 % (ref 19.6–45.3)
MCH RBC QN AUTO: 30.2 PG (ref 26.6–33)
MCHC RBC AUTO-ENTMCNC: 32.5 G/DL (ref 31.5–35.7)
MCV RBC AUTO: 92.9 FL (ref 79–97)
METHADONE UR QL SCN: NEGATIVE
MONOCYTES # BLD AUTO: 0.87 10*3/MM3 (ref 0.1–0.9)
MONOCYTES NFR BLD AUTO: 8.9 % (ref 5–12)
NEUTROPHILS NFR BLD AUTO: 6.72 10*3/MM3 (ref 1.7–7)
NEUTROPHILS NFR BLD AUTO: 69 % (ref 42.7–76)
NRBC BLD AUTO-RTO: 0 /100 WBC (ref 0–0.2)
OPIATES UR QL: NEGATIVE
OXYCODONE UR QL SCN: NEGATIVE
PCP UR QL SCN: NEGATIVE
PLATELET # BLD AUTO: 417 10*3/MM3 (ref 140–450)
PMV BLD AUTO: 8.3 FL (ref 6–12)
POTASSIUM SERPL-SCNC: 3.4 MMOL/L (ref 3.5–5.2)
PROT SERPL-MCNC: 6.6 G/DL (ref 6–8.5)
QT INTERVAL: 375 MS
QTC INTERVAL: 394 MS
RBC # BLD AUTO: 4.21 10*6/MM3 (ref 3.77–5.28)
SALICYLATES SERPL-MCNC: <0.3 MG/DL
SODIUM SERPL-SCNC: 135 MMOL/L (ref 136–145)
T4 FREE SERPL-MCNC: 1.33 NG/DL (ref 0.92–1.68)
TRICYCLICS UR QL SCN: POSITIVE
TSH SERPL DL<=0.05 MIU/L-ACNC: 2.75 UIU/ML (ref 0.27–4.2)
VIT B12 BLD-MCNC: 676 PG/ML (ref 211–946)
WBC NRBC COR # BLD AUTO: 9.75 10*3/MM3 (ref 3.4–10.8)
WHOLE BLOOD HOLD COAG: NORMAL
WHOLE BLOOD HOLD SPECIMEN: NORMAL

## 2025-04-12 PROCEDURE — 84443 ASSAY THYROID STIM HORMONE: CPT

## 2025-04-12 PROCEDURE — 80307 DRUG TEST PRSMV CHEM ANLYZR: CPT

## 2025-04-12 PROCEDURE — 99283 EMERGENCY DEPT VISIT LOW MDM: CPT

## 2025-04-12 PROCEDURE — 82077 ASSAY SPEC XCP UR&BREATH IA: CPT

## 2025-04-12 PROCEDURE — 80143 DRUG ASSAY ACETAMINOPHEN: CPT

## 2025-04-12 PROCEDURE — 99284 EMERGENCY DEPT VISIT MOD MDM: CPT

## 2025-04-12 PROCEDURE — 36415 COLL VENOUS BLD VENIPUNCTURE: CPT

## 2025-04-12 PROCEDURE — 84702 CHORIONIC GONADOTROPIN TEST: CPT

## 2025-04-12 PROCEDURE — 84439 ASSAY OF FREE THYROXINE: CPT | Performed by: EMERGENCY MEDICINE

## 2025-04-12 PROCEDURE — 80053 COMPREHEN METABOLIC PANEL: CPT | Performed by: EMERGENCY MEDICINE

## 2025-04-12 PROCEDURE — 80179 DRUG ASSAY SALICYLATE: CPT

## 2025-04-12 PROCEDURE — 85025 COMPLETE CBC W/AUTO DIFF WBC: CPT

## 2025-04-12 RX ORDER — LORAZEPAM 0.5 MG/1
0.5 TABLET ORAL ONCE
Status: DISCONTINUED | OUTPATIENT
Start: 2025-04-12 | End: 2025-04-12 | Stop reason: HOSPADM

## 2025-04-12 RX ORDER — QUETIAPINE FUMARATE 100 MG/1
100 TABLET, FILM COATED ORAL ONCE
Status: COMPLETED | OUTPATIENT
Start: 2025-04-12 | End: 2025-04-12

## 2025-04-12 RX ORDER — TOPIRAMATE 100 MG/1
200 TABLET, FILM COATED ORAL ONCE
Status: COMPLETED | OUTPATIENT
Start: 2025-04-12 | End: 2025-04-12

## 2025-04-12 RX ORDER — QUETIAPINE FUMARATE 100 MG/1
100 TABLET, FILM COATED ORAL ONCE
Status: COMPLETED | OUTPATIENT
Start: 2025-04-13 | End: 2025-04-13

## 2025-04-12 RX ORDER — DIVALPROEX SODIUM 500 MG/1
500 TABLET, DELAYED RELEASE ORAL ONCE
Status: COMPLETED | OUTPATIENT
Start: 2025-04-12 | End: 2025-04-12

## 2025-04-12 RX ADMIN — QUETIAPINE FUMARATE 100 MG: 100 TABLET ORAL at 07:39

## 2025-04-12 RX ADMIN — TOPIRAMATE 200 MG: 100 TABLET, FILM COATED ORAL at 11:24

## 2025-04-12 RX ADMIN — DIVALPROEX SODIUM 500 MG: 500 TABLET, DELAYED RELEASE ORAL at 11:25

## 2025-04-12 RX ADMIN — CHOLECALCIFEROL CAP 1.25 MG (50000 UNIT) 50000 UNITS: 1.25 CAP at 11:24

## 2025-04-12 RX ADMIN — FLUOXETINE HYDROCHLORIDE 20 MG: 20 CAPSULE ORAL at 11:24

## 2025-04-12 NOTE — SIGNIFICANT NOTE
04/12/25 1533   Plan   Final Note Due to cocnerns for pts mental status by provider and SW, SW filed MIW on behalf of pt this date. SW notified [facility] of MIW this date.

## 2025-04-12 NOTE — ED NOTES
"Pt refused Ativan, states \"No, I take Seroquel, I don't take anything you give me, I'm supposed to go to the hospital.\"  "

## 2025-04-12 NOTE — SIGNIFICANT NOTE
"   04/12/25 1256   Plan   Final Note SW met with pt at bedside this date to discuss outpatient referrals. SW notes that pt was observed to by sitting in her hospital bed comfortably. SW further notes that pt was disorganized in her thought processes. Pt would be speaking about being a \"vegetarian\" as a child and then began speaking about her daughter being removed from her due to being a vegetarian. Pt then would speak about PCP and her father dying due to an atomic bomb. Pt was very difficult to redirect and keep on track this date. Pt denied outpatient referrals. Pt also is currently refusing to take her medications that are at bedside. Pt denied SI/HI stating, \"I wouldn't go to [facility] if I did that\". SW attempted to AO pt, however, pt then stated she was done answering SW questions and stated SW was going to listen to her. Pt states she also already spoke with a SW on 4/11/25 and there is nothing more she needs a SW for. SW updated provider.       SHAI also notes that pt reported she has been evicted and that all of her belongings are gone from her apartment at this time. Per nurse, this is new information and was not reported by pt previously.   "

## 2025-04-12 NOTE — ED NOTES
Fany at [facility] called, stated she will discuss with on call provider and get back to us.

## 2025-04-12 NOTE — ED NOTES
Pt called out stating her genitals were burning and she needed a sponge bath, provided pt with warm wet washclothes, soap and towels.

## 2025-04-12 NOTE — ED NOTES
Vicente from [facility], stated unable to accept due to not having a contract with pt's insurance.

## 2025-04-12 NOTE — SIGNIFICANT NOTE
04/12/25 1715   Plan   Final Note SW contacted [facility]  this date and advised of MIW. SW provided pt history for continuity of care regarding pts mental health history and status.

## 2025-04-12 NOTE — ED NOTES
"Pt's daughter arrived, pt's daughter spoke with pt and came back out into hallway stating she can not take her like this. Told pt I would get our CN to discuss what else we can do. Pt stepped out into hallway yelling \"she's a liar, yall called her, why'd yall call her, she's a liar.\" Pt yelling in hallway, instructed pt to step back into room or we would have to call security. Security came to stand by since pt was yelling in hallway at her daughter and staff. CN spoke with provider and pt's daughter, provider looking into other options for pt.   "

## 2025-04-12 NOTE — ED NOTES
Pt stepped out of room, had changed into her street clothes asking for her IV to be removed. Pt stated she knows were sending her to a psych teran and she's ready to leave now. Explained to pt that we are waiting for a facility to accept her and she would need to wait in her room. Pt requested jello. Jello and water provided.

## 2025-04-12 NOTE — SIGNIFICANT NOTE
04/12/25 1157   Plan   Final Note SW contacted by [facility] and pt was denied due to not meeting their acute criteria at this time. SW followed up with [facility] and they are currently reviewing; [facility] contacted SW this date and advised that they needed clarification on UTI and continuous oxygen; per nurse pt does not have a UTI and has not been on oxygen during her time in the ED this date. SHAI updated [facility] and pending return phone call. SHAI updated provider.

## 2025-04-12 NOTE — ED NOTES
"Spoke to pt's daughter, Elsa Giraldo, stating she is being discharged and she just needs a ride home. Pt's daughter is coming to get her, stated \"I'll be there in a few minutes\".   "

## 2025-04-12 NOTE — ED NOTES
"Pt called out and told  that she hasn't slept since Friday and that we need to \"knock her out\".   "

## 2025-04-12 NOTE — ED NOTES
"Pt requested a bedpan, tried to assist pt onto bedpan, pt pull bedpan and supplies out of my hands stating \"I don't need your help, I made this bed myself, I did all that myself (gesturing to the vitals monitor), yall ain't done nothing, I've done everything my damn self and you keep racing out of here, I can do it myself!\" Explained to pt that I am able to help her if she needs it, pt stated \"Just leave that stuff (gesturing to toilet paper and wipes) on the the table and move that trash can, I don't need no damn help!\" Let pt know to hit her call light if she needed assistance.   "

## 2025-04-12 NOTE — ED NOTES
Per dayshift RN, labs showing due have been collected and sent, verified with lab. Unable to clear without cancelling orders.

## 2025-04-12 NOTE — ED NOTES
"PT REFUSED VITALS TO BE TAKEN, RIPPED HER PULSE OX OFF AND YELLED \"YOU DON'T NEED THEM, I'M DISCHARGED\" attempted to explain to pt that we are still waiting on facility placement, pt began yelling that we were already supposed to send her to that hospital and that she's been up since Friday and we need to \"knock her out\".   "

## 2025-04-12 NOTE — SIGNIFICANT NOTE
04/12/25 0958   Plan   Final Note SW attempted to find placement after nightshift efforts on 4/12/25. Pt was denied by [facility], [facility], [facility] and [facility]. [facility] does not have beds at this time and will not availability until approximately Monday. [facility] did not answer. Referral currently pending with [facility] and [facility]. SW will continue to monitor for discharge planning. SW updated provider.

## 2025-04-12 NOTE — ED NOTES
Ellen from [facility] called, stated unable to accept pt due to no geriatric spots available at this time.

## 2025-04-12 NOTE — SIGNIFICANT NOTE
04/12/25 1226   Plan   Final Note SW staff case due to all geriatric psychiatric facilities denying at this time. SHAI contacted [facility] this date to inquire if pt was currently active with [facility] team; pt is not. [facility] , Sunday, advised that referral could be sent this date for follow up outpatient services. SHAI updated provider.

## 2025-04-12 NOTE — SIGNIFICANT NOTE
04/12/25 1223   Plan   Final Note SW notified by [facility] that pt was denied by [facility] due to pt having a UTI, although, it was communicated to [facility] that pt does not have an UTI at this time. [facility] admission advised they understood and the decision was from a supervisor at this time. SW updated provider.

## 2025-04-12 NOTE — SIGNIFICANT NOTE
04/11/25 2054   Behavior WDL   Behavior WDL all;WDL   Interactions cooperative   Motor Movement no unusual gestures/mannerisms   Emotion Mood WDL   Emotion/Mood/Affect WDL all;X   Affect animated   Emotion/Mood irritable   Speech WDL   Speech WDL speech;X   Speech rambling;hyper verbal   Perceptual State WDL   Perceptual State WDL all;X   Hallucinations denies hallucinations   Perceptual State illusions   Thought Process WDL   Thought Process WDL all;X   Delusions paranoid   Judgment and Insight insight not appropriate to situation   Thought Content suspiciousness;ideas of reference;obsession   Thought Process flight of ideas;illogical;word salad;tangential   Intellectual Performance WDL   Intellectual Performance WDL all;WDL   Intellectual Performance able to comprehend   Level of Consciousness Alert   Coping/Stress   Major Change/Loss/Stressor mental health condition   Patient Personal Strengths self-reliant   Sources of Support none   Techniques to [place] with Loss/Stress/Change medication   [place] Suicide Severity Rating Scale (Screener/Recent Self-Report)   1. Wish to be Dead (Past 1 Month) N   2. Non-Specific Active Suicidal Thoughts (Past 1 Month) N   6. Suicidal Behavior (Lifetime) N         SW met with pt this date. Pt is lying in her bed. Pt rambled during the entire evaluation making it difficult for SW to keep up. Pt was difficult to redirect. Pt rambled from one topic to another rather quickly and SW could not follow pt train of thought. Pt was not making sense and had word salad at times. Pt is paranoid that her neighbors and daughter are poisoning her with mccurdy powder. Pt was getting irritable during conversation and then would calm down. Pt wanted SW to fax a man named Tien who she claims is her power of . She gave SW a number that was numbers and letters mixed. Pt is not experiencing SI or HI. Pt is alert and oriented. Pt does not wish to go to a mental hospital.     JESSA Torres, MSW, CSW

## 2025-04-13 RX ADMIN — QUETIAPINE FUMARATE 100 MG: 100 TABLET ORAL at 00:34

## 2025-04-13 NOTE — ED NOTES
Pt returns to ED by SO after being evaluated by [facility] and being denied admission due to lack of SI/HI

## 2025-04-13 NOTE — SIGNIFICANT NOTE
04/12/25 2042   Plan   Final Note SHAI discussed with supervisor; SHAI contacted [facility] and spoke with Mahendra. Per Mahendra, pt is potentially facing eviction due to not paying rent at this time. He reports he was planning to have a conversation with pt on Monday regarding the rent. He states that if she does not pay he would have to move foward with eviction notice at that time. SHAI updated supervsior, nurse and charge this date.

## 2025-04-13 NOTE — SIGNIFICANT NOTE
04/12/25 2036   Plan   Final [facility] and she has returned to the ED this date due to her current mental state. Per HCSO, pt spoke incoherently about the war, God and other random thoughts while pending evaluation. SW contacted pts daughter only to clarify if pt was pending eviction this date due to attempting to plan for safe discharge; per daughter pt has reported to her and pts brother that she is being evicted, however, they have not been able to verify this information. Per daughter, her and pts brother are not able to care for pt if she has been evicted due to her behaviors/mental status. Pt care or status of pt was not discussed with daughter. SW to monitor pending discharge.

## 2025-04-13 NOTE — DISCHARGE INSTRUCTIONS
Your lab work today was not concerning for any emergent condition.  You did have a mental evaluation [facility] and was found not to qualify for a mental and quest form.  We have exhausted all of her resources available to you in the emergency department.  The social work here today did speak with Mahendra, your landlord.  He states that you have not been evicted and may return to your apartment at this time.  Call Dr. Coreas's office on Monday to discuss your recent symptoms and to get your medications lined out.  Return to the emergency department immediately for any acutely developing altered mental status, any suicidal homicidal ideations, or any new or worse concerns.

## 2025-04-13 NOTE — ED PROVIDER NOTES
Time: 10:19 PM EDT  Date of encounter:  4/12/2025  Independent Historian/Clinical History and Information was obtained by:   Patient and Police    History is limited by:  Patient's psychiatric illness    Chief Complaint: BIPOLAR 1 DISORDER      History of Present Illness:    The patient is a 70 y.o. year old female who presents to the emergency department after a psych/MIW evaluation with [facility].  The patient denies any SI or HI.  She states that she has been evicted from her apartment and cannot return.  She is pleasant to talk with today but is very disorganized in her conversation and has to be constantly redirected to the topic at hand.  She states that she is very hungry and was given a sandwich and some water.  She is able to tell me the day date and time and does recall circumstances over the last 24 hours where she has been in and out of the emergency department and evaluated with [facility].      Patient Care Team  Primary Care Provider: Lei Coreas MD    Past Medical History:     No Known Allergies  Past Medical History:   Diagnosis Date    Anxiety     Bipolar 1 disorder     GERD (gastroesophageal reflux disease)     Hypertension     Seizures      Past Surgical History:   Procedure Laterality Date    BREAST SURGERY      HYSTERECTOMY       History reviewed. No pertinent family history.    Home Medications:  Prior to Admission medications    Medication Sig Start Date End Date Taking? Authorizing Provider   amitriptyline (ELAVIL) 25 MG tablet Take 1 tablet by mouth Every Night.    ProviderElida MD   aspirin 81 MG chewable tablet Chew 1 tablet Daily.    ProviderElida MD   benztropine (COGENTIN) 1 MG tablet Take 1 tablet by mouth 2 (Two) Times a Day.    Elida Donis MD   divalproex (DEPAKOTE) 500 MG DR tablet Take 500 mg by mouth Every Morning.    Elida Donis MD   divalproex (DEPAKOTE) 500 MG DR tablet Take 1,000 mg by mouth every night at bedtime.    Provider  "MD Elida   escitalopram (LEXAPRO) 10 MG tablet Take 1 tablet by mouth Daily.    Elida Donis MD   FLUoxetine (PROzac) 20 MG capsule Take 1 capsule by mouth 2 (Two) Times a Day.    Elida Donis MD   QUEtiapine (SEROquel) 300 MG tablet Take 600 mg by mouth Every Night.    Elida Donis MD   rosuvastatin (CRESTOR) 20 MG tablet Take 1 tablet by mouth Daily.    Elida Doins MD   topiramate (TOPAMAX) 100 MG tablet Take 2 tablets by mouth 2 (Two) Times a Day.    Elida Donis MD   vitamin D (ERGOCALCIFEROL) 1.25 MG (99041 UT) capsule capsule Take 50,000 Units by mouth 2 (Two) Times a Week.    Elida Donis MD   cetirizine (zyrTEC) 10 MG tablet Take 10 mg by mouth Daily.  4/12/25  Elida Donis MD   pantoprazole (PROTONIX) 40 MG EC tablet Take 40 mg by mouth Daily.  4/12/25  Elida Donis MD        Social History:   Social History     Tobacco Use    Smoking status: Every Day     Current packs/day: 0.50     Types: Cigarettes   Substance Use Topics    Alcohol use: Not Currently    Drug use: Yes         Review of Systems:  Review of Systems   Constitutional:  Negative for chills and fever.   HENT:  Negative for congestion, ear pain and sore throat.    Eyes:  Negative for pain.   Respiratory:  Negative for cough, chest tightness and shortness of breath.    Cardiovascular:  Negative for chest pain.   Gastrointestinal:  Negative for abdominal pain, diarrhea, nausea and vomiting.   Genitourinary:  Negative for flank pain and hematuria.   Musculoskeletal:  Negative for joint swelling.   Skin:  Negative for pallor.   Neurological:  Negative for seizures and headaches.   All other systems reviewed and are negative.       Physical Exam:  /71 (BP Location: Right arm, Patient Position: Sitting)   Pulse 68   Temp 98.8 °F (37.1 °C) (Oral)   Resp 18   Ht 157.5 cm (62\")   Wt 68.4 kg (150 lb 12.7 oz)   SpO2 98%   BMI 27.58 kg/m²     Physical Exam "     Medical Decision Making:      Comorbidities that affect care:    Hypertension, GERD, anxiety, bipolar 1 disorder, seizure    External Notes reviewed:    Previous ED Note: Patient has been seen in the emergency department for the last 2 days and also was evaluated at [facility] and was denied for an MIW      The following orders were placed and all results were independently analyzed by me:  Orders Placed This Encounter   Procedures    [place] Draw    Comprehensive Metabolic Panel    Ethanol    Urine Drug Screen - Urine, Clean Catch    Acetaminophen Level    Salicylate Level    TSH    T4, Free    hCG, Quantitative, Pregnancy    CBC Auto Differential    Fentanyl, Urine - Urine, Clean Catch    CBC & Differential    Green Top (Gel)    Lavender Top    Gold Top - SST    Light Blue Top       Medications Given in the Emergency Department:  Medications   QUEtiapine (SEROquel) tablet 100 mg (100 mg Oral Given 4/13/25 0034)        ED Course:    ED Course as of 04/13/25 0642   Sat Apr 12, 2025   2316 Christian charge nurse did speak with Mahendra with the [facility], which is where the patient resides.  He confirmed that the patient has not been evicted and can return to her apartment tonight but states she will need a key.  We are checking to see if the patient has a key with her. [TC]      ED Course User Index  [TC] Adrianne Aguilera APRN       Labs:    Lab Results (last 24 hours)       Procedure Component Value Units Date/Time    CBC & Differential [[id number]]  (Normal) Collected: 04/12/25 2030    Specimen: Blood Updated: 04/12/25 2043    Narrative:      The following orders were created for panel order CBC & Differential.  Procedure                               Abnormality         Status                     ---------                               -----------         ------                     CBC Auto Differential[[id number]]        Normal              Final result                 Please view results for these tests on  the individual orders.    Ethanol [[id number]] Collected: 04/12/25 2030    Specimen: Blood Updated: 04/12/25 2059     Ethanol <10 mg/dL      Ethanol % <0.010 %     Narrative:      Ethanol (Plasma)  <10 Essentially Negative    Toxic Concentrations           mg/dL    Flushing, slowing of reflexes    Impaired visual activity         Depression of CNS              >100  Possible Coma                  >300       Acetaminophen Level [[id number]]  (Normal) Collected: 04/12/25 2030    Specimen: Blood Updated: 04/12/25 2059     Acetaminophen <5.0 mcg/mL     Salicylate Level [[id number]]  (Normal) Collected: 04/12/25 2030    Specimen: Blood Updated: 04/12/25 2059     Salicylate <0.3 mg/dL     TSH [[id number]]  (Normal) Collected: 04/12/25 2030    Specimen: Blood Updated: 04/12/25 2105     TSH 2.750 uIU/mL     T4, Free [[id number]]  (Normal) Collected: 04/12/25 2030    Specimen: Blood Updated: 04/12/25 2302     Free T4 1.33 ng/dL     hCG, Quantitative, Pregnancy [[id number]] Collected: 04/12/25 2030    Specimen: Blood Updated: 04/12/25 2056     HCG Quantitative 2.26 mIU/mL     Narrative:      HCG Ranges by Gestational Age    Females - non-pregnant premenopausal   </= 1mIU/mL HCG  Females - postmenopausal               </= 7mIU/mL HCG    3 Weeks       5.4   -      72 mIU/mL  4 Weeks      10.2   -     708 mIU/mL  5 Weeks       217   -   8,245 mIU/mL  6 Weeks       152   -  32,177 mIU/mL  7 Weeks     4,059   - 153,767 mIU/mL  8 Weeks    31,366   - 149,094 mIU/mL  9 Weeks    59,109   - 135,901 mIU/mL  10 Weeks   44,186   - 170,409 mIU/mL  12 Weeks   27,107   - 201,615 mIU/mL  14 Weeks   24,302   -  93,646 mIU/mL  15 Weeks   12,540   -  69,747 mIU/mL  16 Weeks    8,904   -  55,332 mIU/mL  17 Weeks    8,240   -  51,793 mIU/mL  18 Weeks    9,649   -  55,271 mIU/mL      CBC Auto Differential [[id number]]  (Normal) Collected: 04/12/25 2030    Specimen: Blood Updated: 04/12/25 2043     WBC 9.75 10*3/mm3      RBC 4.21 10*6/mm3       Hemoglobin 12.7 g/dL      Hematocrit 39.1 %      MCV 92.9 fL      MCH 30.2 pg      MCHC 32.5 g/dL      RDW 13.9 %      RDW-SD 46.7 fl      MPV 8.3 fL      Platelets 417 10*3/mm3      Neutrophil % 69.0 %      Lymphocyte % 20.3 %      Monocyte % 8.9 %      Eosinophil % 0.3 %      Basophil % 1.1 %      Immature Grans % 0.4 %      Neutrophils, Absolute 6.72 10*3/mm3      Lymphocytes, Absolute 1.98 10*3/mm3      Monocytes, Absolute 0.87 10*3/mm3      Eosinophils, Absolute 0.03 10*3/mm3      Basophils, Absolute 0.11 10*3/mm3      Immature Grans, Absolute 0.04 10*3/mm3      nRBC 0.0 /100 WBC     Urine Drug Screen - Urine, Clean Catch [[id number]]  (Abnormal) Collected: 04/12/25 2147    Specimen: Urine, Clean Catch Updated: 04/12/25 2214     THC, Screen, Urine Positive     Phencyclidine (PCP), Urine Negative     Cocaine Screen, Urine Negative     Methamphetamine, Ur Negative     Opiate Screen Negative     Amphetamine Screen, Urine Negative     Benzodiazepine Screen, Urine Negative     Tricyclic Antidepressants Screen Positive     Methadone Screen, Urine Negative     Barbiturates Screen, Urine Negative     Oxycodone Screen, Urine Negative     Buprenorphine, Screen, Urine Negative    Narrative:      Cutoff For Drugs Screened:    Amphetamines               500 ng/ml  Barbiturates               200 ng/ml  Benzodiazepines            150 ng/ml  Cocaine                    150 ng/ml  Methadone                  200 ng/ml  Opiates                    100 ng/ml  Phencyclidine               25 ng/ml  THC                         50 ng/ml  Methamphetamine            500 ng/ml  Tricyclic Antidepressants  300 ng/ml  Oxycodone                  100 ng/ml  Buprenorphine               10 ng/ml    The normal value for all drugs tested is negative. This report includes unconfirmed screening results, with the cutoff values listed, to be used for medical treatment purposes only.  Unconfirmed results must not be used for non-medical purposes such as  employment or legal testing.  Clinical consideration should be applied to any drug of abuse test, particularly when unconfirmed results are used.      Fentanyl, Urine - Urine, Clean Catch [[id number]]  (Normal) Collected: 04/12/25 2147    Specimen: Urine, Clean Catch Updated: 04/12/25 2210     Fentanyl, Urine Negative    Narrative:      Negative Threshold:      Fentanyl 5 ng/mL     The normal value for the drug tested is negative. This report includes final unconfirmed screening results to be used for medical treatment purposes only. Unconfirmed results must not be used for non-medical purposes such as employment or legal testing. Clinical consideration should be applied to any drug of abuse test, particularly when unconfirmed results are used.           Comprehensive Metabolic Panel [[id number]]  (Abnormal) Collected: 04/12/25 2207    Specimen: Blood Updated: 04/12/25 2232     Glucose 96 mg/dL      BUN 15 mg/dL      Creatinine 0.67 mg/dL      Sodium 135 mmol/L      Potassium 3.4 mmol/L      Chloride 103 mmol/L      CO2 22.4 mmol/L      Calcium 8.9 mg/dL      Total Protein 6.6 g/dL      Albumin 3.4 g/dL      ALT (SGPT) 17 U/L      AST (SGOT) 20 U/L      Alkaline Phosphatase 62 U/L      Total Bilirubin 0.2 mg/dL      Globulin 3.2 gm/dL      A/G Ratio 1.1 g/dL      BUN/Creatinine Ratio 22.4     Anion Gap 9.6 mmol/L      eGFR 93.0 mL/min/1.73     Narrative:      GFR Categories in Chronic Kidney Disease (CKD)      GFR Category          GFR (mL/min/1.73)    Interpretation  G1                     90 or greater         Normal or high (1)  G2                      60-89                Mild decrease (1)  G3a                   45-59                Mild to moderate decrease  G3b                   30-44                Moderate to severe decrease  G4                    15-29                Severe decrease  G5                    14 or less           Kidney failure          (1)In the absence of evidence of kidney disease, neither GFR  category G1 or G2 fulfill the criteria for CKD.    eGFR calculation 2021 CKD-EPI creatinine equation, which does not include race as a factor             Imaging:    No Radiology Exams Resulted Within Past 24 Hours      Differential Diagnosis and Discussion:    Psychiatric: Differential diagnosis includes but is not limited to depression, psychosis, bipolar disorder, anxiety, manic episode, schizophrenia, and substance abuse.    PROCEDURES:    Labs were collected in the emergency department and all labs were reviewed and interpreted by me.  An EKG was performed and the EKG was interpreted by supervising attending.    No orders to display       Procedures    MDM     Amount and/or Complexity of Data Reviewed  Clinical lab tests: reviewed           Patient Care Considerations:    PSYCH: I considered ordering anxiolytic and or antipsychotic medications, however patient was able to facilitate the medical screening exam and disposition without further medications.      Consultants/Shared Management Plan:    The patient was evaluated by care coordination.  They had sent the patient to [facility] for MIW review and it was a denied and the patient was sent back to the emergency department.  [facility] care coordination then spoke with the patient's landlord who states that the patient is not evicted and may return to her apartment tonight.  Christian charge nurse also spoke with Mahendra the  and he confirmed that the patient may return to her apartment tonight but will need a key.  The patient has a set of keys on a lanyard around her neck which does appear to be an apartment key.    Social Determinants of Health:    Patient is independent, reliable, and has access to care.       Disposition and Care Coordination:    Discharged: I considered escalation of care by admitting this patient to the hospital, however patient was declined at all [facility] facilities, she did not qualify for an MIW and was subsequently  discharged home.    I have explained the patient´s condition, diagnoses and treatment plan based on the information available to me at this time. I have answered questions and addressed any concerns. The patient has a good  understanding of the patient´s diagnosis, condition, and treatment plan as can be expected at this point. The vital signs have been stable. The patient´s condition is stable and appropriate for discharge from the emergency department.      The patient will pursue further outpatient evaluation with the primary care physician or other designated or consulting physician as outlined in the discharge instructions. They are agreeable to this plan of care and follow-up instructions have been explained in detail. The patient has received these instructions in written format and has expressed an understanding of the discharge instructions. The patient is aware that any significant change in condition or worsening of symptoms should prompt an immediate return to this or the closest emergency department or call to 911.  I have explained discharge medications and the need for follow up with the patient/caretakers. This was also printed in the discharge instructions. Patient was discharged with the following medications and follow up:      Medication List      No changes were made to your prescriptions during this visit.      Lei Coreas MD  [address]  [place] [address]  [phone]    Call   MONDAY, FOR FOLLOW UP       Final diagnoses:   Psychosis, unspecified psychosis type   Bipolar 1 disorder        ED Disposition       ED Disposition   Discharge    Condition   Stable    Comment   --               This medical record created using voice recognition software.             Adrianne Aguilera APRN  04/13/25 0642       Adrianne Aguilera APRN  04/13/25 0642
